# Patient Record
Sex: MALE | Race: WHITE | NOT HISPANIC OR LATINO | Employment: OTHER | URBAN - METROPOLITAN AREA
[De-identification: names, ages, dates, MRNs, and addresses within clinical notes are randomized per-mention and may not be internally consistent; named-entity substitution may affect disease eponyms.]

---

## 2017-08-18 ENCOUNTER — APPOINTMENT (OUTPATIENT)
Dept: RADIOLOGY | Facility: MEDICAL CENTER | Age: 72
End: 2017-08-18
Attending: EMERGENCY MEDICINE
Payer: MEDICARE

## 2017-08-18 ENCOUNTER — HOSPITAL ENCOUNTER (EMERGENCY)
Facility: MEDICAL CENTER | Age: 72
End: 2017-08-18
Attending: EMERGENCY MEDICINE
Payer: MEDICARE

## 2017-08-18 VITALS
OXYGEN SATURATION: 96 % | WEIGHT: 183.2 LBS | RESPIRATION RATE: 14 BRPM | HEIGHT: 67 IN | TEMPERATURE: 98.1 F | HEART RATE: 62 BPM | DIASTOLIC BLOOD PRESSURE: 91 MMHG | BODY MASS INDEX: 28.75 KG/M2 | SYSTOLIC BLOOD PRESSURE: 143 MMHG

## 2017-08-18 DIAGNOSIS — R53.1 GENERAL WEAKNESS: ICD-10-CM

## 2017-08-18 DIAGNOSIS — R10.9 FLANK PAIN: ICD-10-CM

## 2017-08-18 DIAGNOSIS — R19.7 DIARRHEA, UNSPECIFIED TYPE: ICD-10-CM

## 2017-08-18 LAB
ALBUMIN SERPL BCP-MCNC: 4.1 G/DL (ref 3.2–4.9)
ALBUMIN/GLOB SERPL: 1.9 G/DL
ALP SERPL-CCNC: 58 U/L (ref 30–99)
ALT SERPL-CCNC: 11 U/L (ref 2–50)
ANION GAP SERPL CALC-SCNC: 9 MMOL/L (ref 0–11.9)
APPEARANCE UR: CLEAR
AST SERPL-CCNC: 25 U/L (ref 12–45)
BASOPHILS # BLD AUTO: 0.4 % (ref 0–1.8)
BASOPHILS # BLD: 0.03 K/UL (ref 0–0.12)
BILIRUB SERPL-MCNC: 1 MG/DL (ref 0.1–1.5)
BUN SERPL-MCNC: 21 MG/DL (ref 8–22)
CALCIUM SERPL-MCNC: 9.3 MG/DL (ref 8.5–10.5)
CHLORIDE SERPL-SCNC: 103 MMOL/L (ref 96–112)
CO2 SERPL-SCNC: 24 MMOL/L (ref 20–33)
COLOR UR AUTO: YELLOW
CREAT SERPL-MCNC: 1.07 MG/DL (ref 0.5–1.4)
EOSINOPHIL # BLD AUTO: 0.02 K/UL (ref 0–0.51)
EOSINOPHIL NFR BLD: 0.3 % (ref 0–6.9)
ERYTHROCYTE [DISTWIDTH] IN BLOOD BY AUTOMATED COUNT: 41.4 FL (ref 35.9–50)
GFR SERPL CREATININE-BSD FRML MDRD: >60 ML/MIN/1.73 M 2
GLOBULIN SER CALC-MCNC: 2.2 G/DL (ref 1.9–3.5)
GLUCOSE SERPL-MCNC: 103 MG/DL (ref 65–99)
GLUCOSE UR QL STRIP.AUTO: NEGATIVE MG/DL
HCT VFR BLD AUTO: 43.6 % (ref 42–52)
HGB BLD-MCNC: 15.9 G/DL (ref 14–18)
IMM GRANULOCYTES # BLD AUTO: 0.02 K/UL (ref 0–0.11)
IMM GRANULOCYTES NFR BLD AUTO: 0.3 % (ref 0–0.9)
INR PPP: 1.11 (ref 0.87–1.13)
KETONES UR QL STRIP.AUTO: ABNORMAL MG/DL
LEUKOCYTE ESTERASE UR QL STRIP.AUTO: NEGATIVE
LYMPHOCYTES # BLD AUTO: 1.24 K/UL (ref 1–4.8)
LYMPHOCYTES NFR BLD: 17.6 % (ref 22–41)
MCH RBC QN AUTO: 34.4 PG (ref 27–33)
MCHC RBC AUTO-ENTMCNC: 36.5 G/DL (ref 33.7–35.3)
MCV RBC AUTO: 94.4 FL (ref 81.4–97.8)
MONOCYTES # BLD AUTO: 0.57 K/UL (ref 0–0.85)
MONOCYTES NFR BLD AUTO: 8.1 % (ref 0–13.4)
NEUTROPHILS # BLD AUTO: 5.15 K/UL (ref 1.82–7.42)
NEUTROPHILS NFR BLD: 73.3 % (ref 44–72)
NITRITE UR QL STRIP.AUTO: NEGATIVE
NRBC # BLD AUTO: 0 K/UL
NRBC BLD AUTO-RTO: 0 /100 WBC
PH UR STRIP.AUTO: 5 [PH]
PLATELET # BLD AUTO: 226 K/UL (ref 164–446)
PMV BLD AUTO: 9.6 FL (ref 9–12.9)
POTASSIUM SERPL-SCNC: 3.7 MMOL/L (ref 3.6–5.5)
PROT SERPL-MCNC: 6.3 G/DL (ref 6–8.2)
PROT UR QL STRIP: NEGATIVE MG/DL
PROTHROMBIN TIME: 14.7 SEC (ref 12–14.6)
RBC # BLD AUTO: 4.62 M/UL (ref 4.7–6.1)
RBC UR QL AUTO: ABNORMAL
SODIUM SERPL-SCNC: 136 MMOL/L (ref 135–145)
SP GR UR: 1.02
WBC # BLD AUTO: 7 K/UL (ref 4.8–10.8)

## 2017-08-18 PROCEDURE — 96375 TX/PRO/DX INJ NEW DRUG ADDON: CPT

## 2017-08-18 PROCEDURE — 304562 HCHG STAT O2 MASK/CANNULA

## 2017-08-18 PROCEDURE — 85610 PROTHROMBIN TIME: CPT

## 2017-08-18 PROCEDURE — 81002 URINALYSIS NONAUTO W/O SCOPE: CPT

## 2017-08-18 PROCEDURE — 85025 COMPLETE CBC W/AUTO DIFF WBC: CPT

## 2017-08-18 PROCEDURE — 99284 EMERGENCY DEPT VISIT MOD MDM: CPT

## 2017-08-18 PROCEDURE — 80053 COMPREHEN METABOLIC PANEL: CPT

## 2017-08-18 PROCEDURE — 74176 CT ABD & PELVIS W/O CONTRAST: CPT

## 2017-08-18 PROCEDURE — 700111 HCHG RX REV CODE 636 W/ 250 OVERRIDE (IP): Performed by: EMERGENCY MEDICINE

## 2017-08-18 PROCEDURE — 36415 COLL VENOUS BLD VENIPUNCTURE: CPT

## 2017-08-18 PROCEDURE — 96374 THER/PROPH/DIAG INJ IV PUSH: CPT

## 2017-08-18 RX ORDER — MORPHINE SULFATE 4 MG/ML
4 INJECTION, SOLUTION INTRAMUSCULAR; INTRAVENOUS ONCE
Status: COMPLETED | OUTPATIENT
Start: 2017-08-18 | End: 2017-08-18

## 2017-08-18 RX ORDER — TRAMADOL HYDROCHLORIDE 50 MG/1
50-100 TABLET ORAL EVERY 8 HOURS PRN
Qty: 30 TAB | Refills: 0 | Status: SHIPPED | OUTPATIENT
Start: 2017-08-18

## 2017-08-18 RX ORDER — ONDANSETRON 2 MG/ML
4 INJECTION INTRAMUSCULAR; INTRAVENOUS ONCE
Status: COMPLETED | OUTPATIENT
Start: 2017-08-18 | End: 2017-08-18

## 2017-08-18 RX ORDER — KETOROLAC TROMETHAMINE 30 MG/ML
30 INJECTION, SOLUTION INTRAMUSCULAR; INTRAVENOUS ONCE
Status: COMPLETED | OUTPATIENT
Start: 2017-08-18 | End: 2017-08-18

## 2017-08-18 RX ADMIN — KETOROLAC TROMETHAMINE 30 MG: 30 INJECTION, SOLUTION INTRAMUSCULAR at 14:50

## 2017-08-18 RX ADMIN — ONDANSETRON 4 MG: 2 INJECTION INTRAMUSCULAR; INTRAVENOUS at 14:50

## 2017-08-18 RX ADMIN — MORPHINE SULFATE 4 MG: 4 INJECTION INTRAVENOUS at 14:50

## 2017-08-18 ASSESSMENT — PAIN SCALES - GENERAL
PAINLEVEL_OUTOF10: 7
PAINLEVEL_OUTOF10: 5

## 2017-08-18 NOTE — ED NOTES
"Ronak Sampson  72 y.o.  Chief Complaint   Patient presents with   • Flank Pain     right sided flank pain since this am, \"feels like a kidney stone\", \"been going down hill for a week\"   • Diarrhea     x 10-14 days, denies vomiting     Urine specimen supplies and instructions provided. Pt assisted to senior luishéctor with family, instructed on red phone use for assistance.   "

## 2017-08-18 NOTE — DISCHARGE INSTRUCTIONS
Flank Pain  Flank pain is pain in your side. The flank is the area of your side between your upper belly (abdomen) and your back. Pain in this area can be caused by many different things.  HOME CARE  Home care and treatment will depend on the cause of your pain.  · Rest as told by your doctor.  · Drink enough fluids to keep your pee (urine) clear or pale yellow.    · Only take medicine as told by your doctor.  · Tell your doctor about any changes in your pain.  · Follow up with your doctor.  GET HELP RIGHT AWAY IF:   · Your pain does not get better with medicine.    · You have new symptoms or your symptoms get worse.  · Your pain gets worse.    · You have belly (abdominal) pain.    · You are short of breath.    · You always feel sick to your stomach (nauseous).    · You keep throwing up (vomiting).    · You have puffiness (swelling) in your belly.    · You feel light-headed or you pass out (faint).    · You have blood in your pee.  · You have a fever or lasting symptoms for more than 2-3 days.  · You have a fever and your symptoms suddenly get worse.  MAKE SURE YOU:   · Understand these instructions.  · Will watch your condition.  · Will get help right away if you are not doing well or get worse.     This information is not intended to replace advice given to you by your health care provider. Make sure you discuss any questions you have with your health care provider.     Document Released: 09/26/2009 Document Revised: 01/08/2016 Document Reviewed: 08/01/2013  [employer] Interactive Patient Education ©2016 Elsevier Inc.    Diarrhea  Diarrhea is watery poop (stool). It can make you feel weak, tired, thirsty, or give you a dry mouth (signs of dehydration). Watery poop is a sign of another problem, most often an infection. It often lasts 2-3 days. It can last longer if it is a sign of something serious. Take care of yourself as told by your doctor.  HOME CARE   · Drink 1 cup (8 ounces) of fluid each time you have watery  poop.  · Do not drink the following fluids:  ¨ Those that contain simple sugars (fructose, glucose, galactose, lactose, sucrose, maltose).  ¨ Sports drinks.  ¨ Fruit juices.  ¨ Whole milk products.  ¨ Sodas.  ¨ Drinks with caffeine (coffee, tea, soda) or alcohol.  · Oral rehydration solution may be used if the doctor says it is okay. You may make your own solution. Follow this recipe:  ¨  - teaspoon table salt.  ¨ ¾ teaspoon baking soda.  ¨  teaspoon salt substitute containing potassium chloride.  ¨ 1 tablespoons sugar.  ¨ 1 liter (34 ounces) of water.  · Avoid the following foods:  ¨ High fiber foods, such as raw fruits and vegetables.  ¨ Nuts, seeds, and whole grain breads and cereals.  ¨  Those that are sweetened with sugar alcohols (xylitol, sorbitol, mannitol).  · Try eating the following foods:  ¨ Starchy foods, such as rice, toast, pasta, low-sugar cereal, oatmeal, baked potatoes, crackers, and bagels.  ¨ Bananas.  ¨ Applesauce.  · Eat probiotic-rich foods, such as yogurt and milk products that are fermented.  · Wash your hands well after each time you have watery poop.  · Only take medicine as told by your doctor.  · Take a warm bath to help lessen burning or pain from having watery poop.  GET HELP RIGHT AWAY IF:   · You cannot drink fluids without throwing up (vomiting).  · You keep throwing up.  · You have blood in your poop, or your poop looks black and tarry.  · You do not pee (urinate) in 6-8 hours, or there is only a small amount of very dark pee.  · You have belly (abdominal) pain that gets worse or stays in the same spot (localizes).  · You are weak, dizzy, confused, or light-headed.  · You have a very bad headache.  · Your watery poop gets worse or does not get better.  · You have a fever or lasting symptoms for more than 2-3 days.  · You have a fever and your symptoms suddenly get worse.  MAKE SURE YOU:   · Understand these instructions.  · Will watch your condition.  · Will get help right away if  you are not doing well or get worse.     This information is not intended to replace advice given to you by your health care provider. Make sure you discuss any questions you have with your health care provider.     Document Released: 06/05/2009 Document Revised: 01/08/2016 Document Reviewed: 08/25/2013  Elsevier Interactive Patient Education ©2016 Elsevier Inc.

## 2017-08-18 NOTE — ED NOTES
The Medication Reconciliation process has been completed by interviewing the patient    Allergies have been reviewed  Antibiotic use in 30 days - none

## 2017-08-18 NOTE — ED NOTES
Patient unable to provide stool sample.  ERP aware.  PIV removed.  Patient and significant other given discharge instructions, follow up information, and prescription x 1, instructed not to drive home, verbalized understanding, sent home with stool specimen collection supplies and lab slip.  Ambulatory out of ED w/steady gait.

## 2017-08-18 NOTE — ED AVS SNAPSHOT
Home Care Instructions                                                                                                                Ronak Sampson   MRN: [id number]    Department:  [facility], Emergency Dept   Date of Visit:  8/18/2017            [facility], Emergency Dept    [address]    [place] [address]    Phone:  [phone]      You were seen by     Garrick Foster D.O.      Your Diagnosis Was     Flank pain     R10.9       These are the medications you received during your hospitalization from 08/18/2017 1139 to 08/18/2017 1621     Date/Time Order Dose Route Action    08/18/2017 1450 ketorolac (TORADOL) injection 30 mg 30 mg Intravenous Given    08/18/2017 1450 morphine (pf) 4 mg/ml injection 4 mg 4 mg Intravenous Given    08/18/2017 1450 ondansetron (ZOFRAN) syringe/vial injection 4 mg 4 mg Intravenous Given      Follow-up Information     1. Follow up with [facility].    Contact information    [address]  [facility] [address] [phone]        2. Follow up with Mauri Ram M.D..    Specialty:  Urology    Why:  Urologist    Contact information    [address]  [address]  [facility] [address]  [phone]        Medication Information     Review all of your home medications and newly ordered medications with your primary doctor and/or pharmacist as soon as possible. Follow medication instructions as directed by your doctor and/or pharmacist.     Please keep your complete medication list with you and share with your physician. Update the information when medications are discontinued, doses are changed, or new medications (including over-the-counter products) are added; and carry medication information at all times in the event of emergency situations.               Medication List      START taking these medications        Instructions    Morning Afternoon Evening Bedtime    tramadol 50 MG Tabs   Commonly known as:  ULTRAM        Take  1-2 Tabs by mouth every 8 hours as needed (pain).   Dose:   mg                          ASK your doctor about these medications        Instructions    Morning Afternoon Evening Bedtime    acetaminophen 500 MG Tabs   Commonly known as:  TYLENOL        Take 500-1,000 mg by mouth every 6 hours as needed.   Dose:  500-1000 mg                             Where to Get Your Medications      You can get these medications from any pharmacy     Bring a paper prescription for each of these medications    - tramadol 50 MG Tabs            Procedures and tests performed during your visit     CBC WITH DIFFERENTIAL    COMP METABOLIC PANEL    CT-RENAL COLIC EVALUATION(A/P W/O)    ESTIMATED GFR    POC UA    PROTHROMBIN TIME    SALINE LOCK        Discharge Instructions       Flank Pain  Flank pain is pain in your side. The flank is the area of your side between your upper belly (abdomen) and your back. Pain in this area can be caused by many different things.  HOME CARE  Home care and treatment will depend on the cause of your pain.  · Rest as told by your doctor.  · Drink enough fluids to keep your pee (urine) clear or pale yellow.    · Only take medicine as told by your doctor.  · Tell your doctor about any changes in your pain.  · Follow up with your doctor.  GET HELP RIGHT AWAY IF:   · Your pain does not get better with medicine.    · You have new symptoms or your symptoms get worse.  · Your pain gets worse.    · You have belly (abdominal) pain.    · You are short of breath.    · You always feel sick to your stomach (nauseous).    · You keep throwing up (vomiting).    · You have puffiness (swelling) in your belly.    · You feel light-headed or you pass out (faint).    · You have blood in your pee.  · You have a fever or lasting symptoms for more than 2-3 days.  · You have a fever and your symptoms suddenly get worse.  MAKE SURE YOU:   · Understand these instructions.  · Will watch your condition.  · Will get help right away  if you are not doing well or get worse.     This information is not intended to replace advice given to you by your health care provider. Make sure you discuss any questions you have with your health care provider.     Document Released: 09/26/2009 Document Revised: 01/08/2016 Document Reviewed: 08/01/2013  [employer] Interactive Patient Education ©2016 Elsevier Inc.    Diarrhea  Diarrhea is watery poop (stool). It can make you feel weak, tired, thirsty, or give you a dry mouth (signs of dehydration). Watery poop is a sign of another problem, most often an infection. It often lasts 2-3 days. It can last longer if it is a sign of something serious. Take care of yourself as told by your doctor.  HOME CARE   · Drink 1 cup (8 ounces) of fluid each time you have watery poop.  · Do not drink the following fluids:  ¨ Those that contain simple sugars (fructose, glucose, galactose, lactose, sucrose, maltose).  ¨ Sports drinks.  ¨ Fruit juices.  ¨ Whole milk products.  ¨ Sodas.  ¨ Drinks with caffeine (coffee, tea, soda) or alcohol.  · Oral rehydration solution may be used if the doctor says it is okay. You may make your own solution. Follow this recipe:  ¨  - teaspoon table salt.  ¨ ¾ teaspoon baking soda.  ¨  teaspoon salt substitute containing potassium chloride.  ¨ 1 tablespoons sugar.  ¨ 1 liter (34 ounces) of water.  · Avoid the following foods:  ¨ High fiber foods, such as raw fruits and vegetables.  ¨ Nuts, seeds, and whole grain breads and cereals.  ¨  Those that are sweetened with sugar alcohols (xylitol, sorbitol, mannitol).  · Try eating the following foods:  ¨ Starchy foods, such as rice, toast, pasta, low-sugar cereal, oatmeal, baked potatoes, crackers, and bagels.  ¨ Bananas.  ¨ Applesauce.  · Eat probiotic-rich foods, such as yogurt and milk products that are fermented.  · Wash your hands well after each time you have watery poop.  · Only take medicine as told by your doctor.  · Take a warm bath to help lessen  burning or pain from having watery poop.  GET HELP RIGHT AWAY IF:   · You cannot drink fluids without throwing up (vomiting).  · You keep throwing up.  · You have blood in your poop, or your poop looks black and tarry.  · You do not pee (urinate) in 6-8 hours, or there is only a small amount of very dark pee.  · You have belly (abdominal) pain that gets worse or stays in the same spot (localizes).  · You are weak, dizzy, confused, or light-headed.  · You have a very bad headache.  · Your watery poop gets worse or does not get better.  · You have a fever or lasting symptoms for more than 2-3 days.  · You have a fever and your symptoms suddenly get worse.  MAKE SURE YOU:   · Understand these instructions.  · Will watch your condition.  · Will get help right away if you are not doing well or get worse.     This information is not intended to replace advice given to you by your health care provider. Make sure you discuss any questions you have with your health care provider.     Document Released: 06/05/2009 Document Revised: 01/08/2016 Document Reviewed: 08/25/2013  [employer] Interactive Patient Education ©2016 [employer]            Patient Information     Patient Information    Following emergency treatment: all patient requiring follow-up care must return either to a private physician or a clinic if your condition worsens before you are able to obtain further medical attention, please return to the emergency room.     Billing Information    At [facility], we work to make the billing process streamlined for our patients.  Our Representatives are here to answer any questions you may have regarding your hospital bill.  If you have insurance coverage and have supplied your insurance information to us, we will submit a claim to your insurer on your behalf.  Should you have any questions regarding your bill, we can be reached online or by phone as follows:  Online: You are able pay your bills online or live chat with  our representatives about any billing questions you may have. We are here to help Monday - Friday from 8:00am to 7:30pm and 9:00am - 12:00pm on Saturdays.  Please visit [email or web address]  for more information.   Phone:  [phone] or [phone]    Please note that your emergency physician, surgeon, pathologist, radiologist, anesthesiologist, and other specialists are not employed by [facility] and will therefore bill separately for their services.  Please contact them directly for any questions concerning their bills at the numbers below:     Emergency Physician Services:  [phone]  [facility]:  [phone]  [facility]:  [phone]  [facility]:  [phone]    1. Your final bill may vary from the amount quoted upon discharge if all procedures are not complete at that time, or if your doctor has additional procedures of which we are not aware. You will receive an additional bill if you return to the Emergency Department at [facility] for suture removal regardless of the facility of which the sutures were placed.     2. Please arrange for settlement of this account at the emergency registration.    3. All self-pay accounts are due in full at the time of treatment.  If you are unable to meet this obligation then payment is expected within 4-5 days.     4. If you have had radiology studies (CT, X-ray, Ultrasound, MRI), you have received a preliminary result during your emergency department visit. Please contact the radiology department [phone] to receive a copy of your final result. Please discuss the Final result with your primary physician or with the follow up physician provided.     Crisis Hotline:  [place] Crisis Hotline:  0-343-IXXKCSN or 1-520.233.4237  [place] Crisis Hotline:    1-667.392.1135 or 384-628-5341         ED Discharge Follow Up Questions    1. In order to provide you with very good care, we  would like to follow up with a phone call in the next few days.  May we have your permission to contact you?     YES /  NO    2. What is the best phone number to call you? (       )_____-__________    3. What is the best time to call you?      Morning  /  Afternoon  /  Evening                   Patient Signature:  ____________________________________________________________    Date:  ____________________________________________________________

## 2017-08-18 NOTE — ED AVS SNAPSHOT
ZeroFOX Access Code: [email or web address]  Expires: 9/17/2017  4:21 PM    ZeroFOX  A secure, online tool to manage your health information     [employer]’s ZeroFOX® is a secure, online tool that connects you to your personalized health information from the privacy of your home -- day or night - making it very easy for you to manage your healthcare. Once the activation process is completed, you can even access your medical information using the ZeroFOX sixto, which is available for free in the [employer] Sixto store or Google Play store.     ZeroFOX provides the following levels of access (as shown below):   My Chart Features   [facility] Primary Care Doctor [facility]  Specialists [facility]  Urgent  Care Non-[facility]  Primary Care  Doctor   Email your healthcare team securely and privately 24/7 X X X X   Manage appointments: schedule your next appointment; view details of past/upcoming appointments X      Request prescription refills. X      View recent personal medical records, including lab and immunizations X X X X   View health record, including health history, allergies, medications X X X X   Read reports about your outpatient visits, procedures, consult and ER notes X X X X   See your discharge summary, which is a recap of your hospital and/or ER visit that includes your diagnosis, lab results, and care plan. X X       How to register for ZeroFOX:  1. Go to  [email or web address].  2. Click on the Sign Up Now box, which takes you to the New Member Sign Up page. You will need to provide the following information:  a. Enter your ZeroFOX Access Code exactly as it appears at the top of this page. (You will not need to use this code after you’ve completed the sign-up process. If you do not sign up before the expiration date, you must request a new code.)   b. Enter your date of birth.   c. Enter your home email address.   d. Click Submit, and follow the next screen’s instructions.  3. Create a ZeroFOX ID. This will be your ZeroFOX  login ID and cannot be changed, so think of one that is secure and easy to remember.  4. Create a [employer] password. You can change your password at any time.  5. Enter your Password Reset Question and Answer. This can be used at a later time if you forget your password.   6. Enter your e-mail address. This allows you to receive e-mail notifications when new information is available in [employer].  7. Click Sign Up. You can now view your health information.    For assistance activating your [employer] account, call [phone]

## 2017-08-18 NOTE — ED AVS SNAPSHOT
8/18/2017    Ronak Sampson  [id number] [facility] [address]    Dear Ronak:    Psychiatric hospital wants to ensure your discharge home is safe and you or your loved ones have had all of your questions answered regarding your care after you leave the hospital.    Below is a list of resources and contact information should you have any questions regarding your hospital stay, follow-up instructions, or active medical symptoms.    Questions or Concerns Regarding… Contact   Medical Questions Related to Your Discharge  (7 days a week, 8am-5pm) Contact a Nurse Care Coordinator   [phone]   Medical Questions Not Related to Your Discharge  (24 hours a day / 7 days a week)  Contact the Nurse Health Line   [phone]    Medications or Discharge Instructions Refer to your discharge packet   or contact your [facility] Primary Care Provider   [phone]   Follow-up Appointment(s) Schedule your appointment via August   or contact Scheduling [phone]   Billing Review your statement via August  or contact Billing [phone]   Medical Records Review your records via August   or contact Medical Records [phone]     You may receive a telephone call within two days of discharge. This call is to make certain you understand your discharge instructions and have the opportunity to have any questions answered. You can also easily access your medical information, test results and upcoming appointments via the [employer] free online health management tool. You can learn more and sign up at [employer]/August. For assistance setting up your August account, please call [phone].    Once again, we want to ensure your discharge home is safe and that you have a clear understanding of any next steps in your care. If you have any questions or concerns, please do not hesitate to contact us, we are here for you. Thank you for choosing [facility] for your healthcare needs.    Sincerely,    Your [facility] Healthcare Team

## 2017-08-19 NOTE — ED PROVIDER NOTES
"ED Provider Note    CHIEF COMPLAINT  Chief Complaint   Patient presents with   • Flank Pain     right sided flank pain since this am, \"feels like a kidney stone\", \"been going down hill for a week\"   • Diarrhea     x 10-14 days, denies vomiting       HPI  Ronak Sampson is a 72 y.o. male who presents to worsen today with complaint of right-sided flank and back pain. he states this been present for one week. He also states he's had diarrhea on and off for 2-4 weeks. he has very poor historian and very anxious at times. He has had no blood in his stool stool has normal color. He states that he has been \"going downhill\" and when he states his he refers to weakness that he is not his usual self and is generalized. No chest pain no shortness of breath fever or chills. Apparently he has had a history kidney stones on the left side but states that his pain does feel like kidney stone however when asked he does have a history of chronic back pain which is in the same area of his pain today. Pain is reproduced with movement or palpation denies any numbness or tingling no incontinence of bowel or bladder, no saddle paresthesia.    REVIEW OF SYSTEMS  See HPI for further details. All other systems are negative.     PAST MEDICAL HISTORY  No past medical history on file.    FAMILY HISTORY  [unfilled]    SOCIAL HISTORY  Social History     Social History   • Marital Status:      Spouse Name: N/A   • Number of Children: N/A   • Years of Education: N/A     Social History Main Topics   • Smoking status: Not on file   • Smokeless tobacco: Not on file   • Alcohol Use: Not on file   • Drug Use: Not on file   • Sexual Activity: Not on file     Other Topics Concern   • Not on file     Social History Narrative       SURGICAL HISTORY  Past Surgical History   Procedure Laterality Date   • Gastroscopy-endo N/A 2/25/2016     Procedure: GASTROSCOPY-ENDO;  Surgeon: Piyush Cole M.D.;  Location: ENDOSCOPY [facility];  Service:    • " "Gastroscopy with clipping  2/26/2016     Procedure: GASTROSCOPY WITH CLIPPING;  Surgeon: Lee Powers M.D.;  Location: ENDOSCOPY [facility];  Service:    • Colonoscopy - endo  2/28/2016     Procedure: COLONOSCOPY - ENDO;  Surgeon: Lee Powers M.D.;  Location: ENDOSCOPY [facility];  Service:        CURRENT MEDICATIONS  Home Medications     Reviewed by Efren Downs (Pharmacy Tech) on 08/18/17 at 1542  Med List Status: Complete    Medication Last Dose Status    acetaminophen (TYLENOL) 500 MG Tab 8/18/2017 Active                ALLERGIES  No Known Allergies    PHYSICAL EXAM  VITAL SIGNS: /91 mmHg  Pulse 62  Temp(Src) 36.7 °C (98.1 °F) (Temporal)  Resp 14  Ht 1.702 m (5' 7.01\")  Wt 83.1 kg (183 lb 3.2 oz)  BMI 28.69 kg/m2  SpO2 96%      Constitutional: Well developed, Well nourished, No acute distress, Non-toxic appearance.   HENT: Normocephalic, Atraumatic, Bilateral external ears normal, Oropharynx moist, No oral exudates, Nose normal.   Eyes: PERRLA, EOMI, Conjunctiva normal, No discharge.   Neck: Normal range of motion, No tenderness, Supple, No stridor.   Lymphatic: No lymphadenopathy noted.   Cardiovascular: Normal heart rate, Normal rhythm, No murmurs, No rubs, No gallops.   Thorax & Lungs: Normal breath sounds, No respiratory distress, No wheezing, No chest tenderness.   Abdomen: Bowel sounds normal, Soft, No tenderness, No masses, No pulsatile masses.   Skin: Warm, Dry, No erythema, No rash.   Back: Tenderness over the lower thoracic upper lumbar area increased on the right with palpation or movement. No bony gross deformities noted   Extremities: Intact distal pulses, No edema, No tenderness, No cyanosis, No clubbing.   Musculoskeletal: Good range of motion in all major joints. No tenderness to palpation or major deformities noted.   Neurologic: Alert & oriented x 3, Normal motor function, Normal sensory function, No focal deficits noted.   Psychiatric: Affect " normal, Judgment normal, Mood anxious       RADIOLOGY/PROCEDURES  CT-RENAL COLIC EVALUATION(A/P W/O)   Final Result      1.  There are left lower pole calyceal stones without obstruction.   2.  There are no right renal calcifications.   3.  There is no hydronephrosis or hydroureter there is no urolithiasis.   4.  There are bilateral renal cysts.   5.  The appendix is normal.   6.  There is minimal sigmoid diverticulosis without diverticulitis.            COURSE & MEDICAL DECISION MAKING  Pertinent Labs & Imaging studies reviewed. (See chart for details)  Patient has had kidney stones on the left side but these are nonobstructing, urine did show evidence of blood discussed need for follow-up with urologist repeat urine test 1-2 weeks. There is no evidence of kidney stone on the right. I suggested to patient we need to stool specimens he does have well water at home he was unable to provide stool specimen for us so he was sent home with outpatient order for O&P/C. difficile/stool culture. He will follow up with his GI physician contacted this week for appointment at [facility]. I feel that this is causing his generalized weakness there is no evidence of kidney stone on the right side and is left sided kidney stones or nonobstructive. He does have history of chronic pain and this may be his pain is experiencing in his back which certainly is worse lately and he was placed on Ultram for home for his pain. If he develops fever, vomiting, worsening or persistent symptoms over next 12-24 hours or blood in his stool to return promptly to the emergency room patient has wife verbalized understanding instructions need for follow-up as described above.    FINAL IMPRESSION  1. Acute flank pain/thoracolumbar back pain  2. Diarrhea   3.         Electronically signed by: Garrick Foster, 8/18/2017 10:24 PM

## 2017-08-29 ENCOUNTER — HOSPITAL ENCOUNTER (OUTPATIENT)
Facility: MEDICAL CENTER | Age: 72
End: 2017-08-29
Attending: EMERGENCY MEDICINE
Payer: MEDICARE

## 2017-08-29 PROCEDURE — 87493 C DIFF AMPLIFIED PROBE: CPT

## 2017-08-30 ENCOUNTER — HOSPITAL ENCOUNTER (OUTPATIENT)
Facility: MEDICAL CENTER | Age: 72
End: 2017-08-30
Attending: EMERGENCY MEDICINE
Payer: MEDICARE

## 2017-08-30 LAB
C DIFF DNA SPEC QL NAA+PROBE: NEGATIVE
C DIFF TOX GENS STL QL NAA+PROBE: NEGATIVE
G LAMBLIA+C PARVUM AG STL QL RAPID: NORMAL
SIGNIFICANT IND 70042: NORMAL
SOURCE SOURCE: NORMAL

## 2017-08-30 PROCEDURE — 87329 GIARDIA AG IA: CPT

## 2017-08-30 PROCEDURE — 87328 CRYPTOSPORIDIUM AG IA: CPT

## 2017-08-30 PROCEDURE — 87046 STOOL CULTR AEROBIC BACT EA: CPT

## 2017-08-30 PROCEDURE — 87899 AGENT NOS ASSAY W/OPTIC: CPT

## 2017-08-30 PROCEDURE — 87045 FECES CULTURE AEROBIC BACT: CPT

## 2017-08-31 LAB
E COLI SXT1+2 STL IA: NORMAL
SIGNIFICANT IND 70042: NORMAL
SOURCE SOURCE: NORMAL

## 2017-09-03 LAB
BACTERIA STL CULT: NORMAL
E COLI SXT1+2 STL IA: NORMAL
SIGNIFICANT IND 70042: NORMAL
SOURCE SOURCE: NORMAL

## 2018-11-14 ENCOUNTER — APPOINTMENT (RX ONLY)
Dept: URBAN - METROPOLITAN AREA CLINIC 20 | Facility: CLINIC | Age: 73
Setting detail: DERMATOLOGY
End: 2018-11-14

## 2018-11-30 ENCOUNTER — APPOINTMENT (RX ONLY)
Dept: URBAN - METROPOLITAN AREA CLINIC 20 | Facility: CLINIC | Age: 73
Setting detail: DERMATOLOGY
End: 2018-11-30

## 2018-11-30 DIAGNOSIS — Z71.89 OTHER SPECIFIED COUNSELING: ICD-10-CM

## 2018-11-30 DIAGNOSIS — D18.0 HEMANGIOMA: ICD-10-CM

## 2018-11-30 DIAGNOSIS — D22 MELANOCYTIC NEVI: ICD-10-CM

## 2018-11-30 DIAGNOSIS — L81.4 OTHER MELANIN HYPERPIGMENTATION: ICD-10-CM

## 2018-11-30 DIAGNOSIS — L82.1 OTHER SEBORRHEIC KERATOSIS: ICD-10-CM

## 2018-11-30 DIAGNOSIS — L82.0 INFLAMED SEBORRHEIC KERATOSIS: ICD-10-CM

## 2018-11-30 DIAGNOSIS — L57.0 ACTINIC KERATOSIS: ICD-10-CM

## 2018-11-30 PROBLEM — D22.61 MELANOCYTIC NEVI OF RIGHT UPPER LIMB, INCLUDING SHOULDER: Status: ACTIVE | Noted: 2018-11-30

## 2018-11-30 PROBLEM — D22.5 MELANOCYTIC NEVI OF TRUNK: Status: ACTIVE | Noted: 2018-11-30

## 2018-11-30 PROBLEM — D18.01 HEMANGIOMA OF SKIN AND SUBCUTANEOUS TISSUE: Status: ACTIVE | Noted: 2018-11-30

## 2018-11-30 PROBLEM — D48.5 NEOPLASM OF UNCERTAIN BEHAVIOR OF SKIN: Status: ACTIVE | Noted: 2018-11-30

## 2018-11-30 PROBLEM — D22.62 MELANOCYTIC NEVI OF LEFT UPPER LIMB, INCLUDING SHOULDER: Status: ACTIVE | Noted: 2018-11-30

## 2018-11-30 PROBLEM — I10 ESSENTIAL (PRIMARY) HYPERTENSION: Status: ACTIVE | Noted: 2018-11-30

## 2018-11-30 PROCEDURE — ? COUNSELING

## 2018-11-30 PROCEDURE — 17110 DESTRUCTION B9 LES UP TO 14: CPT | Mod: 59

## 2018-11-30 PROCEDURE — ? BIOPSY BY SHAVE METHOD

## 2018-11-30 PROCEDURE — ? LIQUID NITROGEN

## 2018-11-30 PROCEDURE — 17004 DESTROY PREMAL LESIONS 15/>: CPT

## 2018-11-30 PROCEDURE — 99203 OFFICE O/P NEW LOW 30 MIN: CPT | Mod: 25

## 2018-11-30 PROCEDURE — 11100: CPT | Mod: 59

## 2018-11-30 PROCEDURE — ? SUNSCREEN RECOMMENDATIONS

## 2018-11-30 ASSESSMENT — LOCATION DETAILED DESCRIPTION DERM
LOCATION DETAILED: LEFT CENTRAL MALAR CHEEK
LOCATION DETAILED: LEFT CENTRAL FRONTAL SCALP
LOCATION DETAILED: RIGHT DISTAL LATERAL POSTERIOR UPPER ARM
LOCATION DETAILED: RIGHT MEDIAL UPPER BACK
LOCATION DETAILED: RIGHT POSTERIOR SHOULDER
LOCATION DETAILED: RIGHT SUPERIOR PARIETAL SCALP
LOCATION DETAILED: LEFT RADIAL DORSAL HAND
LOCATION DETAILED: RIGHT CENTRAL MALAR CHEEK
LOCATION DETAILED: SUPERIOR THORACIC SPINE
LOCATION DETAILED: RIGHT ANTERIOR PROXIMAL UPPER ARM
LOCATION DETAILED: RIGHT VENTRAL DISTAL FOREARM
LOCATION DETAILED: RIGHT MID-UPPER BACK
LOCATION DETAILED: LEFT SUPERIOR FOREHEAD
LOCATION DETAILED: MID POSTERIOR NECK
LOCATION DETAILED: LEFT SUPERIOR MEDIAL MALAR CHEEK
LOCATION DETAILED: LEFT INFERIOR MEDIAL MALAR CHEEK
LOCATION DETAILED: RIGHT INFERIOR UPPER BACK
LOCATION DETAILED: INFERIOR MID FOREHEAD
LOCATION DETAILED: LEFT SUPERIOR UPPER BACK
LOCATION DETAILED: EPIGASTRIC SKIN
LOCATION DETAILED: LEFT INFERIOR LATERAL MALAR CHEEK
LOCATION DETAILED: LEFT ANTERIOR PROXIMAL UPPER ARM
LOCATION DETAILED: LEFT MEDIAL INFERIOR CHEST
LOCATION DETAILED: RIGHT LATERAL EYEBROW
LOCATION DETAILED: LEFT CENTRAL LATERAL NECK
LOCATION DETAILED: LEFT INFERIOR CENTRAL MALAR CHEEK
LOCATION DETAILED: RIGHT CLAVICULAR NECK
LOCATION DETAILED: RIGHT SUPERIOR LATERAL UPPER BACK
LOCATION DETAILED: RIGHT DISTAL DORSAL FOREARM
LOCATION DETAILED: LEFT VENTRAL PROXIMAL FOREARM
LOCATION DETAILED: LEFT PROXIMAL POSTERIOR UPPER ARM
LOCATION DETAILED: INFERIOR THORACIC SPINE
LOCATION DETAILED: LEFT CLAVICULAR NECK
LOCATION DETAILED: RIGHT INFERIOR MEDIAL FOREHEAD
LOCATION DETAILED: LEFT SUPERIOR LATERAL BUCCAL CHEEK
LOCATION DETAILED: RIGHT LATERAL FOREHEAD
LOCATION DETAILED: RIGHT PROXIMAL DORSAL FOREARM
LOCATION DETAILED: RIGHT RADIAL DORSAL HAND
LOCATION DETAILED: RIGHT SUPERIOR OCCIPITAL SCALP
LOCATION DETAILED: LEFT SUPERIOR PARIETAL SCALP
LOCATION DETAILED: LEFT PROXIMAL DORSAL FOREARM
LOCATION DETAILED: STERNUM
LOCATION DETAILED: LEFT SUPERIOR LATERAL MALAR CHEEK
LOCATION DETAILED: LEFT SUPERIOR OCCIPITAL SCALP
LOCATION DETAILED: LEFT CENTRAL BUCCAL CHEEK

## 2018-11-30 ASSESSMENT — LOCATION SIMPLE DESCRIPTION DERM
LOCATION SIMPLE: LEFT OCCIPITAL SCALP
LOCATION SIMPLE: POSTERIOR NECK
LOCATION SIMPLE: LEFT CHEEK
LOCATION SIMPLE: LEFT SCALP
LOCATION SIMPLE: RIGHT FOREHEAD
LOCATION SIMPLE: RIGHT FOREARM
LOCATION SIMPLE: ABDOMEN
LOCATION SIMPLE: RIGHT ANTERIOR NECK
LOCATION SIMPLE: NECK
LOCATION SIMPLE: LEFT UPPER ARM
LOCATION SIMPLE: RIGHT HAND
LOCATION SIMPLE: CHEST
LOCATION SIMPLE: SCALP
LOCATION SIMPLE: LEFT HAND
LOCATION SIMPLE: LEFT ANTERIOR NECK
LOCATION SIMPLE: UPPER BACK
LOCATION SIMPLE: RIGHT EYEBROW
LOCATION SIMPLE: LEFT FOREHEAD
LOCATION SIMPLE: RIGHT CHEEK
LOCATION SIMPLE: RIGHT UPPER BACK
LOCATION SIMPLE: RIGHT UPPER ARM
LOCATION SIMPLE: RIGHT SHOULDER
LOCATION SIMPLE: LEFT FOREARM
LOCATION SIMPLE: LEFT UPPER BACK
LOCATION SIMPLE: INFERIOR FOREHEAD
LOCATION SIMPLE: RIGHT OCCIPITAL SCALP

## 2018-11-30 ASSESSMENT — LOCATION ZONE DERM
LOCATION ZONE: NECK
LOCATION ZONE: ARM
LOCATION ZONE: FACE
LOCATION ZONE: TRUNK
LOCATION ZONE: HAND
LOCATION ZONE: SCALP

## 2018-11-30 NOTE — PROCEDURE: BIOPSY BY SHAVE METHOD
Type Of Destruction Used: Curettage
Anesthesia Volume In Cc: 1
Billing Type: Third-Party Bill
Dressing: Band-Aid
Was A Bandage Applied: Yes
Post-Care Instructions: I reviewed with the patient in detail post-care instructions. Patient is to keep the biopsy site clean once daily and then apply petroleum and bandaid  until healed.
Hemostasis: Drysol and Electrocautery
Detail Level: Detailed
Biopsy Method: Personna blade
Notification Instructions: Patient will be notified of biopsy results. However, patient instructed to call the office if not contacted within 2 weeks.
Lab: [id number]
Bill For Surgical Tray: no
Anesthesia Type: 1% lidocaine with 1:100,000 epinephrine and a 1:12 solution of 8.4% sodium bicarbonate
Consent: Written consent was obtained and risks were reviewed including but not limited to scarring, infection, bleeding, scabbing, incomplete removal, nerve damage and allergy to anesthesia.
Lab Facility: 
Size Of Lesion In Cm: 1.2
Additional Anesthesia Volume In Cc (Will Not Render If 0): 0
Depth Of Biopsy: dermis
Wound Care: Bacitracin
Biopsy Type: H and E

## 2018-11-30 NOTE — PROCEDURE: LIQUID NITROGEN
Consent: The patient's consent was obtained including but not limited to risks of crusting, scabbing, blistering, scarring, darker or lighter pigmentary change, recurrence, incomplete removal and infection.
Include Z78.9 (Other Specified Conditions Influencing Health Status) As An Associated Diagnosis?: No
Medical Necessity Clause: This procedure was medically necessary because the lesions that were treated were:
Medical Necessity Information: It is in your best interest to select a reason for this procedure from the list below. All of these items fulfill various CMS LCD requirements except the new and changing color options.
Detail Level: Detailed
Post-Care Instructions: I reviewed with the patient in detail post-care instructions. Patient is to wear sunprotection, and avoid picking at any of the treated lesions. Pt may apply Vaseline to crusted or scabbing areas.
Duration Of Freeze Thaw-Cycle (Seconds): 10
Number Of Freeze-Thaw Cycles: 2 freeze-thaw cycles
Render Post-Care Instructions In Note?: yes
Consent: The patient's consent was obtained including but not limited to risks of crusting, scabbing, blistering, scarring, darker or lighter pigmentary change, recurrence, incomplete removal and infection. RTC in 2 months if lesion(s) persistent.

## 2019-03-25 ENCOUNTER — APPOINTMENT (RX ONLY)
Dept: URBAN - METROPOLITAN AREA CLINIC 20 | Facility: CLINIC | Age: 74
Setting detail: DERMATOLOGY
End: 2019-03-25

## 2019-03-25 DIAGNOSIS — D18.0 HEMANGIOMA: ICD-10-CM

## 2019-03-25 DIAGNOSIS — L82.0 INFLAMED SEBORRHEIC KERATOSIS: ICD-10-CM

## 2019-03-25 DIAGNOSIS — L81.4 OTHER MELANIN HYPERPIGMENTATION: ICD-10-CM

## 2019-03-25 DIAGNOSIS — Z71.89 OTHER SPECIFIED COUNSELING: ICD-10-CM

## 2019-03-25 DIAGNOSIS — L82.1 OTHER SEBORRHEIC KERATOSIS: ICD-10-CM

## 2019-03-25 DIAGNOSIS — D22 MELANOCYTIC NEVI: ICD-10-CM

## 2019-03-25 DIAGNOSIS — L57.0 ACTINIC KERATOSIS: ICD-10-CM

## 2019-03-25 PROBLEM — D22.62 MELANOCYTIC NEVI OF LEFT UPPER LIMB, INCLUDING SHOULDER: Status: ACTIVE | Noted: 2019-03-25

## 2019-03-25 PROBLEM — D22.61 MELANOCYTIC NEVI OF RIGHT UPPER LIMB, INCLUDING SHOULDER: Status: ACTIVE | Noted: 2019-03-25

## 2019-03-25 PROBLEM — D18.01 HEMANGIOMA OF SKIN AND SUBCUTANEOUS TISSUE: Status: ACTIVE | Noted: 2019-03-25

## 2019-03-25 PROBLEM — D22.5 MELANOCYTIC NEVI OF TRUNK: Status: ACTIVE | Noted: 2019-03-25

## 2019-03-25 PROCEDURE — ? COUNSELING

## 2019-03-25 PROCEDURE — 17000 DESTRUCT PREMALG LESION: CPT

## 2019-03-25 PROCEDURE — 99214 OFFICE O/P EST MOD 30 MIN: CPT | Mod: 25

## 2019-03-25 PROCEDURE — 17003 DESTRUCT PREMALG LES 2-14: CPT

## 2019-03-25 PROCEDURE — ? SUNSCREEN RECOMMENDATIONS

## 2019-03-25 PROCEDURE — ? LIQUID NITROGEN

## 2019-03-25 ASSESSMENT — LOCATION DETAILED DESCRIPTION DERM
LOCATION DETAILED: RIGHT INFERIOR UPPER BACK
LOCATION DETAILED: RIGHT RADIAL DORSAL HAND
LOCATION DETAILED: RIGHT MEDIAL FOREHEAD
LOCATION DETAILED: LEFT RADIAL DORSAL HAND
LOCATION DETAILED: RIGHT PROXIMAL DORSAL FOREARM
LOCATION DETAILED: LEFT SUPERIOR CENTRAL MALAR CHEEK
LOCATION DETAILED: LEFT VENTRAL PROXIMAL FOREARM
LOCATION DETAILED: RIGHT INFERIOR MEDIAL FOREHEAD
LOCATION DETAILED: INFERIOR THORACIC SPINE
LOCATION DETAILED: LEFT SUPERIOR HELIX
LOCATION DETAILED: SUPERIOR THORACIC SPINE
LOCATION DETAILED: RIGHT CENTRAL MALAR CHEEK
LOCATION DETAILED: RIGHT MEDIAL UPPER BACK
LOCATION DETAILED: RIGHT VENTRAL DISTAL FOREARM
LOCATION DETAILED: LEFT DISTAL DORSAL FOREARM
LOCATION DETAILED: RIGHT SUPERIOR HELIX
LOCATION DETAILED: RIGHT DISTAL DORSAL FOREARM
LOCATION DETAILED: LEFT PROXIMAL DORSAL FOREARM

## 2019-03-25 ASSESSMENT — LOCATION SIMPLE DESCRIPTION DERM
LOCATION SIMPLE: LEFT FOREARM
LOCATION SIMPLE: RIGHT HAND
LOCATION SIMPLE: UPPER BACK
LOCATION SIMPLE: RIGHT FOREHEAD
LOCATION SIMPLE: RIGHT UPPER BACK
LOCATION SIMPLE: RIGHT EAR
LOCATION SIMPLE: RIGHT CHEEK
LOCATION SIMPLE: RIGHT FOREARM
LOCATION SIMPLE: LEFT CHEEK
LOCATION SIMPLE: LEFT EAR
LOCATION SIMPLE: LEFT HAND

## 2019-03-25 ASSESSMENT — LOCATION ZONE DERM
LOCATION ZONE: EAR
LOCATION ZONE: HAND
LOCATION ZONE: ARM
LOCATION ZONE: TRUNK
LOCATION ZONE: FACE

## 2019-03-25 NOTE — PROCEDURE: LIQUID NITROGEN
Detail Level: Detailed
Duration Of Freeze Thaw-Cycle (Seconds): 10
Post-Care Instructions: I reviewed with the patient in detail post-care instructions. Patient is to wear sunprotection, and avoid picking at any of the treated lesions. Pt may apply Vaseline to crusted or scabbing areas.
Number Of Freeze-Thaw Cycles: 2 freeze-thaw cycles
Consent: The patient's consent was obtained including but not limited to risks of crusting, scabbing, blistering, scarring, darker or lighter pigmentary change, recurrence, incomplete removal and infection. RTC in 2 months if lesion(s) persistent.
Render Post-Care Instructions In Note?: yes

## 2019-09-23 ENCOUNTER — APPOINTMENT (RX ONLY)
Dept: URBAN - METROPOLITAN AREA CLINIC 20 | Facility: CLINIC | Age: 74
Setting detail: DERMATOLOGY
End: 2019-09-23

## 2019-09-23 DIAGNOSIS — L82.1 OTHER SEBORRHEIC KERATOSIS: ICD-10-CM

## 2019-09-23 DIAGNOSIS — L81.4 OTHER MELANIN HYPERPIGMENTATION: ICD-10-CM

## 2019-09-23 DIAGNOSIS — L82.0 INFLAMED SEBORRHEIC KERATOSIS: ICD-10-CM

## 2019-09-23 DIAGNOSIS — Z71.89 OTHER SPECIFIED COUNSELING: ICD-10-CM

## 2019-09-23 DIAGNOSIS — L57.0 ACTINIC KERATOSIS: ICD-10-CM

## 2019-09-23 DIAGNOSIS — D22 MELANOCYTIC NEVI: ICD-10-CM

## 2019-09-23 DIAGNOSIS — D18.0 HEMANGIOMA: ICD-10-CM

## 2019-09-23 PROBLEM — D22.62 MELANOCYTIC NEVI OF LEFT UPPER LIMB, INCLUDING SHOULDER: Status: ACTIVE | Noted: 2019-09-23

## 2019-09-23 PROBLEM — D22.61 MELANOCYTIC NEVI OF RIGHT UPPER LIMB, INCLUDING SHOULDER: Status: ACTIVE | Noted: 2019-09-23

## 2019-09-23 PROBLEM — D18.01 HEMANGIOMA OF SKIN AND SUBCUTANEOUS TISSUE: Status: ACTIVE | Noted: 2019-09-23

## 2019-09-23 PROBLEM — D22.5 MELANOCYTIC NEVI OF TRUNK: Status: ACTIVE | Noted: 2019-09-23

## 2019-09-23 PROCEDURE — 99214 OFFICE O/P EST MOD 30 MIN: CPT | Mod: 25

## 2019-09-23 PROCEDURE — ? LIQUID NITROGEN

## 2019-09-23 PROCEDURE — 17000 DESTRUCT PREMALG LESION: CPT

## 2019-09-23 PROCEDURE — ? COUNSELING

## 2019-09-23 PROCEDURE — 17003 DESTRUCT PREMALG LES 2-14: CPT

## 2019-09-23 PROCEDURE — ? ADDITIONAL NOTES

## 2019-09-23 PROCEDURE — ? SUNSCREEN RECOMMENDATIONS

## 2019-09-23 PROCEDURE — ? PHOTODYNAMIC THERAPY COUNSELING

## 2019-09-23 ASSESSMENT — LOCATION ZONE DERM
LOCATION ZONE: SCALP
LOCATION ZONE: TRUNK
LOCATION ZONE: LEG
LOCATION ZONE: HAND
LOCATION ZONE: ARM
LOCATION ZONE: NECK
LOCATION ZONE: FACE

## 2019-09-23 ASSESSMENT — LOCATION SIMPLE DESCRIPTION DERM
LOCATION SIMPLE: RIGHT TEMPLE
LOCATION SIMPLE: SCALP
LOCATION SIMPLE: RIGHT EYEBROW
LOCATION SIMPLE: LEFT HAND
LOCATION SIMPLE: LEFT FOREARM
LOCATION SIMPLE: RIGHT UPPER BACK
LOCATION SIMPLE: RIGHT FOREHEAD
LOCATION SIMPLE: RIGHT SCALP
LOCATION SIMPLE: RIGHT FOREARM
LOCATION SIMPLE: RIGHT HAND
LOCATION SIMPLE: NECK
LOCATION SIMPLE: LEFT FOREHEAD
LOCATION SIMPLE: LEFT SCALP
LOCATION SIMPLE: UPPER BACK
LOCATION SIMPLE: RIGHT THIGH

## 2019-09-23 ASSESSMENT — LOCATION DETAILED DESCRIPTION DERM
LOCATION DETAILED: LEFT MEDIAL FRONTAL SCALP
LOCATION DETAILED: SUPERIOR THORACIC SPINE
LOCATION DETAILED: RIGHT VENTRAL DISTAL FOREARM
LOCATION DETAILED: RIGHT INFERIOR UPPER BACK
LOCATION DETAILED: LEFT CENTRAL LATERAL NECK
LOCATION DETAILED: LEFT PROXIMAL DORSAL FOREARM
LOCATION DETAILED: RIGHT PROXIMAL DORSAL FOREARM
LOCATION DETAILED: RIGHT RADIAL DORSAL HAND
LOCATION DETAILED: RIGHT MEDIAL TEMPLE
LOCATION DETAILED: RIGHT SUPERIOR PARIETAL SCALP
LOCATION DETAILED: RIGHT ANTERIOR MEDIAL DISTAL THIGH
LOCATION DETAILED: RIGHT CENTRAL LATERAL NECK
LOCATION DETAILED: LEFT VENTRAL PROXIMAL FOREARM
LOCATION DETAILED: RIGHT INFERIOR MEDIAL FOREHEAD
LOCATION DETAILED: INFERIOR THORACIC SPINE
LOCATION DETAILED: LEFT FOREHEAD
LOCATION DETAILED: RIGHT MEDIAL UPPER BACK
LOCATION DETAILED: LEFT RADIAL DORSAL HAND
LOCATION DETAILED: RIGHT LATERAL EYEBROW
LOCATION DETAILED: RIGHT CENTRAL PARIETAL SCALP
LOCATION DETAILED: RIGHT MEDIAL FRONTAL SCALP
LOCATION DETAILED: LEFT SUPERIOR LATERAL NECK

## 2019-09-23 NOTE — PROCEDURE: LIQUID NITROGEN
Number Of Freeze-Thaw Cycles: 2 freeze-thaw cycles
Render Post-Care Instructions In Note?: yes
Detail Level: Detailed
Consent: The patient's consent was obtained including but not limited to risks of crusting, scabbing, blistering, scarring, darker or lighter pigmentary change, recurrence, incomplete removal and infection. RTC in 2 months if lesion(s) persistent.
Render Note In Bullet Format When Appropriate: No
Post-Care Instructions: I reviewed with the patient in detail post-care instructions. Patient is to wear sunprotection, and avoid picking at any of the treated lesions. Pt may apply Vaseline to crusted or scabbing areas.
Duration Of Freeze Thaw-Cycle (Seconds): 10

## 2019-09-23 NOTE — PROCEDURE: ADDITIONAL NOTES
Additional Notes: Authorization has been submitted for red light for face, ears and neck
Detail Level: Detailed

## 2021-01-21 ENCOUNTER — HOSPITAL ENCOUNTER (OUTPATIENT)
Dept: HOSPITAL 8 - CVU | Age: 76
Discharge: HOME | End: 2021-01-21
Attending: INTERNAL MEDICINE
Payer: MEDICARE

## 2021-01-21 DIAGNOSIS — Z87.891: ICD-10-CM

## 2021-01-21 DIAGNOSIS — Z82.49: ICD-10-CM

## 2021-01-21 DIAGNOSIS — I11.9: ICD-10-CM

## 2021-01-21 DIAGNOSIS — I08.8: Primary | ICD-10-CM

## 2021-01-21 PROCEDURE — 93306 TTE W/DOPPLER COMPLETE: CPT

## 2021-03-19 ENCOUNTER — OFFICE VISIT (OUTPATIENT)
Dept: NEUROLOGY | Facility: MEDICAL CENTER | Age: 76
End: 2021-03-19
Attending: PSYCHIATRY & NEUROLOGY
Payer: MEDICARE

## 2021-03-19 VITALS
DIASTOLIC BLOOD PRESSURE: 86 MMHG | TEMPERATURE: 99 F | OXYGEN SATURATION: 95 % | HEART RATE: 78 BPM | RESPIRATION RATE: 12 BRPM | BODY MASS INDEX: 29.3 KG/M2 | HEIGHT: 68 IN | SYSTOLIC BLOOD PRESSURE: 142 MMHG | WEIGHT: 193.34 LBS

## 2021-03-19 DIAGNOSIS — M54.50 CHRONIC RIGHT-SIDED LOW BACK PAIN WITHOUT SCIATICA: ICD-10-CM

## 2021-03-19 DIAGNOSIS — G89.29 CHRONIC RIGHT-SIDED LOW BACK PAIN WITHOUT SCIATICA: ICD-10-CM

## 2021-03-19 DIAGNOSIS — N40.1 BENIGN PROSTATIC HYPERPLASIA WITH LOWER URINARY TRACT SYMPTOMS, SYMPTOM DETAILS UNSPECIFIED: ICD-10-CM

## 2021-03-19 DIAGNOSIS — G20.A1 PARKINSON'S DISEASE (HCC): ICD-10-CM

## 2021-03-19 PROCEDURE — 99211 OFF/OP EST MAY X REQ PHY/QHP: CPT | Performed by: PSYCHIATRY & NEUROLOGY

## 2021-03-19 PROCEDURE — 99204 OFFICE O/P NEW MOD 45 MIN: CPT | Performed by: PSYCHIATRY & NEUROLOGY

## 2021-03-19 RX ORDER — OMEPRAZOLE 40 MG/1
40 CAPSULE, DELAYED RELEASE ORAL
COMMUNITY
Start: 2021-02-01

## 2021-03-19 RX ORDER — TAMSULOSIN HYDROCHLORIDE 0.4 MG/1
0.4 CAPSULE ORAL
COMMUNITY
Start: 2021-02-23 | End: 2021-09-22

## 2021-03-19 RX ORDER — HYDROCHLOROTHIAZIDE 25 MG/1
25 TABLET ORAL
COMMUNITY
Start: 2021-01-23 | End: 2021-09-22

## 2021-03-19 NOTE — PATIENT INSTRUCTIONS
Start carbidopa/levodopa 1 tab 3x/day (about every 6 hours while awake).     Parkinson's Disease  Parkinson's disease is a type of movement disorder. It is a long-term condition that gets worse over time (is progressive). Each person with Parkinson's disease is affected differently.  This condition limits your ability to control movements and move your body normally. The condition can range from mild to severe. Parkinson's disease tends to get worse slowly over several years.  What are the causes?  Parkinson's disease results from a loss of brain cells (neurons) that make a brain chemical called dopamine. Dopamine is needed to control movement. As the condition gets worse, neurons make less dopamine. This makes it hard to move or control your movements.  The exact cause of the loss of neurons and why they make less dopamine is not known. Factors related to genes and the environment may contribute to the cause of Parkinson's disease.  What increases the risk?  The following factors may make you more likely to develop this condition:  · Being male.  · Being age 60 or older.  · Having a family history of Parkinson's disease.  · Having had a traumatic brain injury.  · Having experienced depression.  · Having been exposed to toxins, such as pesticides.  What are the signs or symptoms?  Symptoms of this condition can vary. The main symptoms are related to movement. These include:  · A tremor or shaking while you are resting that you cannot control.  · Stiffness in your neck, arms, and legs (rigidity).  · Slowing of movement. You may lose facial expressions and have trouble making small movements that are needed to button clothing or brush your teeth.  · An abnormal walk. You may walk with short, shuffling steps.  · Loss of balance and stability when standing. You may sway, fall backward, and have trouble making turns.  Other symptoms include:  · Mental or cognitive changes including depression, anxiety, having false  beliefs (delusions), or seeing, hearing, or feeling things that do not exist (hallucinations).  · Trouble speaking or swallowing.  · Changes in bowel or bladder functions including constipation, having to go urgently or frequently, or not being able to control your bowel or bladder.  · Changes in sleep habits or trouble sleeping.  Parkinson's disease may be graded by severity of your condition as mild, moderate, or advanced. Parkinson's disease progression is different for everyone. You may not progress to the advanced stage.  · Mild Parkinson's disease involves:  ? Movement problems that do not affect daily activities.  ? Movement problems on one side of the body.  · Moderate Parkinson's disease involves:  ? Movement problems on both sides of the body.  ? Slowing of movement.  ? Coordination and balance problems.  · Advanced Parkinson's disease involves:  ? Extreme difficulty walking.  ? Inability to live alone safely.  ? Signs of dementia, such as having trouble remembering things, doing daily tasks such as getting dressed, and problem solving.  How is this diagnosed?  This condition is diagnosed by a specialist. A diagnosis may be made based on symptoms, your medical history, and a physical exam. You may also have brain imaging tests to check for a loss of dopamine-producing areas of the brain.  How is this treated?  There is no cure for Parkinson's disease. Treatment focuses on managing your symptoms. Treatment may include:  · Medicines. Everyone responds to medicines differently. Your response may change over time. Work with your health care provider to find the best medicines for you.  · Speech, occupational, and physical therapy.  · Deep brain stimulation surgery to reduce tremors and other involuntary movements.  Follow these instructions at home:  Medicines  · Take over-the-counter and prescription medicines only as told by your health care provider.  · Avoid taking medicines that can affect thinking, such  as pain or sleeping medicines.  Eating and drinking  · Follow instructions from your health care provider about eating or drinking restrictions.  · Do not drink alcohol.  Activity  · Talk with your health care provider about if it is safe for you to drive.  · Do exercises as told by your health care provider or physical therapist.  Lifestyle         · Install grab bars and railings in your home to prevent falls.  · Do not use any products that contain nicotine or tobacco, such as cigarettes, e-cigarettes, and chewing tobacco. If you need help quitting, ask your health care provider.  · Consider joining a support group for people with Parkinson's disease.  General instructions  · Work with your health care provider to determine what you need help with and what your safety needs are.  · Keep all follow-up visits as told by your health care provider, including any visits with a physical therapist, speech therapist, or occupational therapist. This is important.  Contact a health care provider if:  · Medicines do not help your symptoms.  · You are unsteady or have fallen at home.  · You need more support to function well at home.  · You have trouble swallowing.  · You have severe constipation.  · You are having problems with side effects from your medicines.  · You feel confused, anxious, or depressed.  Get help right away if you:  · Are injured after a fall.  · See or hear things that are not real.  · Cannot swallow without choking.  · Have chest pain or trouble breathing.  · Do not feel safe at home.  · Have thoughts about hurting yourself or others.  If you ever feel like you may hurt yourself or others, or have thoughts about taking your own life, get help right away. You can go to your nearest emergency department or call:  · Your local emergency services (911 in the [place]  · A suicide crisis helpline, such as the National Suicide Prevention Lifeline at 1-841.304.6184. This is open 24 hours a  day.  Summary  · Parkinson's disease is a long-term condition that gets worse over time. This condition limits your ability to control your movements and move your body normally.  · There is no cure for Parkinson's disease. Treatment focuses on managing your symptoms.  · Work with your health care provider to determine what you need help with and what your safety needs are.  · Keep all follow-up visits as told by your health care provider, including any visits with a physical therapist, speech therapist, or occupational therapist. This is important.  This information is not intended to replace advice given to you by your health care provider. Make sure you discuss any questions you have with your health care provider.  Document Released: 12/15/2001 Document Revised: 03/05/2020 Document Reviewed: 03/05/2020  [employer] Patient Education © 2020 [employer]

## 2021-03-19 NOTE — PROGRESS NOTES
Chief Complaint   Patient presents with   • New Patient     Tremor       History of present illness:  Ronak Sampson 75 y.o. right handed male with right arm tremor and poor coordination first noted after cervical spine ablation therapy. He has a diagnosis of BPH on tamsulosin.    He has to think about tying his shoes or using his right arm. There is no problem with the right leg per patient but wife endorses that he shuffles occasionally around the house.   His wife has noticed that he is quieter. No change in facial expression per wife.     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    Sleep: Is up half the night per wife. Does not use anything.    Constipation: No problems.   Urination: On tamsulosin.    Dizziness: No   Hallucinations: No   Cognition: No   Balance: No recent falls.  Pain: He has pain in the low mid right back. This is constantly present and aching.     Past medical history:   No past medical history on file.    Past surgical history:   Past Surgical History:   Procedure Laterality Date   • COLONOSCOPY - ENDO  2/28/2016    Procedure: COLONOSCOPY - ENDO;  Surgeon: Lee Powers M.D.;  Location: [facility];  Service:    • GASTROSCOPY WITH CLIPPING  2/26/2016    Procedure: GASTROSCOPY WITH CLIPPING;  Surgeon: Lee Powers M.D.;  Location: ENDOSCOPY [facility];  Service:    • GASTROSCOPY-ENDO N/A 2/25/2016    Procedure: GASTROSCOPY-ENDO;  Surgeon: Piyush Cole M.D.;  Location: ENDOSCOPY [facility];  Service:        Family history:   No family history on file.    Social history:   Social History     Socioeconomic History   • Marital status:      Spouse name: Not on file   • Number of children: Not on file   • Years of education: Not on file   • Highest education level: Not on file   Occupational History   • Not on file   Tobacco Use   • Smoking status: Never Smoker   • Smokeless tobacco: Never Used   Substance and Sexual  "Activity   • Alcohol use: Not Currently   • Drug use: Not on file   • Sexual activity: Not on file   Other Topics Concern   • Not on file   Social History Narrative   • Not on file     Social Determinants of Health     Financial Resource Strain:    • Difficulty of Paying Living Expenses:    Food Insecurity:    • Worried About Running Out of Food in the Last Year:    • Ran Out of Food in the Last Year:    Transportation Needs:    • Lack of Transportation (Medical):    • Lack of Transportation (Non-Medical):    Physical Activity:    • Days of Exercise per Week:    • Minutes of Exercise per Session:    Stress:    • Feeling of Stress :    Social Connections:    • Frequency of Communication with Friends and Family:    • Frequency of Social Gatherings with Friends and Family:    • Attends [employer] Services:    • Active Member of Clubs or Organizations:    • Attends Club or Organization Meetings:    • Marital Status:    Intimate Partner Violence:    • Fear of Current or Ex-Partner:    • Emotionally Abused:    • Physically Abused:    • Sexually Abused:        Current medications:   Current Outpatient Medications   Medication   • hydroCHLOROthiazide (HYDRODIURIL) 25 MG Tab   • omeprazole (PRILOSEC) 40 MG delayed-release capsule   • tamsulosin (FLOMAX) 0.4 MG capsule   • tramadol (ULTRAM) 50 MG Tab   • acetaminophen (TYLENOL) 500 MG Tab     No current facility-administered medications for this visit.       Medication Allergy:  No Known Allergies    Physical examination:   Vitals:    03/19/21 0944   BP: 142/86   BP Location: Left arm   Patient Position: Sitting   BP Cuff Size: Adult   Pulse: 78   Resp: 12   Temp: 37.2 °C (99 °F)   TempSrc: Temporal   SpO2: 95%   Weight: 87.7 kg (193 lb 5.5 oz)   Height: 1.737 m (5' 8.4\")     UNIFIED PARKINSON'S DISEASE RATING SCALE PART III: MOTOR EXAMINATION    The patient is NOT on medication for treating the symptoms of Parkinson's disease  The patient's clinical state is: OFF  Last L-DOPA " dose: N/A     Speech: 1: Slight, loss of modulation, diction, or volume, but still all words easy to understand  Facial expression: 2: Mild - In addition to decreased eye-blink frequency, masked facies present in the lower face, but lips not parted.  Rest tremor amplitude    Lip/jaw: 0: Normal - No tremor.    RUE: 2: Mild: > 1 cm but < 3 cm in maximal amplitude.    LUE: 0: Normal - No tremor.     RLE: 2: Mild: > 1 cm but < 3 cm in maximal amplitude.     LLE: 0: Normal - No tremor.    Postural tremor of the hands   Right: 1: Slight - Tremor is present but less than 1 cm in amplitude.   Left: 0: Normal - No tremor.   Kinetic tremor of the hands    Right: 0: Normal - No tremor.    Left: 0: Normal - No tremor.   Rigidity    Neck: 2: Mild - rigidity detected without the activation maneuver, but full range of motion is easily achieved.   RUE: 2: Mild - rigidity detected without the activation maneuver, but full range of motion is easily achieved.   LUE: 2: Mild - rigidity detected without the activation maneuver, but full range of motion is easily achieved.   RLE: 2: Mild - rigidity detected without the activation maneuver, but full range of motion is easily achieved.   LLE: 0: Normal - no rigidity  Finger tapping    Right: 3: Moderate - any of the following: a) more than 5 interruptions during tapping or at least one longer arrest (freeze) in ongoing movement; b) moderate slowing; c) the amplitude decrements starting after the 1st tap.    Left: 2: Mild - any of the following: a) 3 to 5 interruptions during tapping; b) mild slowing; c) the amplitude decrements midway in the 10-tap sequence.  Hand movements   Right: 3: Moderate - any of the following: a) more than 5 interruptions during the movement or at least one longer arrest (freeze) in ongoing movement; b) moderate slowing; c) the amplitude decrements after the 1st open-and-close sequence.     Left: 1: Slight - any of the following: a) the regular rhythm is broken  with one or two interruptions or hesitations of the movement; b) slight slowing; c) the amplitude decrements near the end of the task.   Pronation-supination movements of hands    Right: 2: Mild- any of the following: a) 3 to 5 interruptions during the movements; b) mild slowing; c) the amplitude decrements midway in the sequence.     Left: 1: Slight -any of the following: a) the regular rhythm is broken with one or two interruptions or hesitations of the movement; b) slight slowing; c) the amplitude decrements near the end of the sequence.   Toe tapping    Right: 1: Slight - any of the following: a) the regular rhythm is broken with one or two interruptions or hesitations of the tapping movement; b) slight slowing; c) amplitude decrements near the end of the ten taps.    Left: 1: Slight - any of the following: a) the regular rhythm is broken with one or two interruptions or hesitations of the tapping movement; b) slight slowing; c) amplitude decrements near the end of the ten taps.   Leg agility   Right: 1: Slight - any of the following: a) the regular rhythm is broken with one or two interruptions or hesitations of the movement; b) slight slowing; c) amplitude decrements near the end of the task.    Left: 1: Slight - any of the following: a) the regular rhythm is broken with one or two interruptions or hesitations of the movement; b) slight slowing; c) amplitude decrements near the end of the task.   Arising from chair: 2: Mild - pushes self up from the arms of the chair without difficulty.  Posture: 2: Mild - Definite flexion, scoliosis or leaning to one side, but patient can correct posture when asked to do so.   Gait: 2: Mild - Independent walking but with substantial gait impairment.  Freezing of gait: 0: Normal - no freezing.  Postural stability: 3: Moderate - Stands safely, but with absence of postural response; falls if not caught by examiner.   Body bradykinesia: 2: Mild - Mild global slowness and poverty  of spontaneous movements.  Constancy of rest tremor: 3: Moderate - Tremor at rest is present 51-75% of the entire examination period.    Were dyskinesias present during examination? No     Oren and Yahr Stage: 3: Mild to moderate involvement; some postural instability but physically independent; needs assistance to recover from pull test.     Labs:  None     Imaging:   None     ASSESSMENT AND PLAN:  Problem List Items Addressed This Visit     Parkinson's disease (HCC)    Chronic right-sided low back pain without sciatica      Other Visit Diagnoses     Benign prostatic hyperplasia with lower urinary tract symptoms, symptom details unspecified        Relevant Medications    tamsulosin (FLOMAX) 0.4 MG capsule          1. Parkinson's disease (HCC)  75 year old male with bradykinesia and right arm/leg rest tremor consistent with Parkinson's disease. Start carbidopa/levodopa 25/100 1 tab 3x/day.     2. Chronic right-sided low back pain without sciatica  I have counseled the patient that low back pain is common in PD and the description sounds musculoskeletal rather than radiculopathy.     3. Benign prostatic hyperplasia with lower urinary tract symptoms, symptom details unspecified  Given research studies showing promise with disease slowing activity with terazosin, I have recommended that he contact his PCP to switch tamsulosin to terazosin.     FOLLOW-UP:   No follow-ups on file.    Total time spent for the day for this patient is: 25 minutes. I spent 22 minutes in face to face time and I spent 3 minutes pre-charting and 0 minutes in post-visit documentation.      Dr. Inderjit Jimenez D.O.  [facility] Neu[facility]   Movement Disorders Specialist

## 2021-04-27 ENCOUNTER — PHYSICAL THERAPY (OUTPATIENT)
Dept: PHYSICAL THERAPY | Facility: REHABILITATION | Age: 76
End: 2021-04-27
Attending: PSYCHIATRY & NEUROLOGY
Payer: MEDICARE

## 2021-04-27 DIAGNOSIS — G20.A1 PARKINSON'S DISEASE (HCC): ICD-10-CM

## 2021-04-27 PROCEDURE — 97163 PT EVAL HIGH COMPLEX 45 MIN: CPT

## 2021-04-27 ASSESSMENT — ENCOUNTER SYMPTOMS
PAIN SCALE AT HIGHEST: 10
PAIN SCALE: 0
EXACERBATED BY: LIFTING
QUALITY: ACHING
EXACERBATED BY: OVERHEAD ACTIVITY
PAIN SCALE AT LOWEST: 0
QUALITY: SHARP
QUALITY: NUMBNESS
EXACERBATED BY: HOUSEWORK

## 2021-04-27 ASSESSMENT — BALANCE ASSESSMENTS
BALANCE - SITTING DYNAMIC: NORMAL
BALANCE - STANDING STATIC: NORMAL
BALANCE - STANDING DYNAMIC: GOOD
BALANCE - SITTING STATIC: NORMAL
WEIGHT SHIFT SITTING: NORMAL
WEIGHT SHIFT STANDING: NORMAL

## 2021-04-27 ASSESSMENT — ACTIVITIES OF DAILY LIVING (ADL): POOR_BALANCE: 1

## 2021-04-27 NOTE — OP THERAPY EVALUATION
Outpatient Physical Therapy  INITIAL NEUROLOGICAL EVALUATION    [facility] Outpatient Physical Therapy [place] [id number] [facility], [address]  [place] [address]  Phone:  [phone]    Date of Evaluation: 2021    Patient: Ronak Sampson  YOB: 1945  MRN: [id number]     Referring Provider: Americo Jimenez D.O.  [address]  [address]  [place] [address]   Referring Diagnosis Parkinson's disease (HCC) [G20]     Time Calculation    Start time: 1000  Stop time: 1100 Time Calculation (min): 60 minutes             Chief Complaint: No chief complaint on file.    Visit Diagnoses     ICD-10-CM   1. Parkinson's disease (HCC)  G20       Subjective:   History of Present Illness:     Mechanism of injury:  75 year old male with bradykinesia and right arm/leg rest tremor consistent with Parkinson's disease 1 month ago. Symptoms began 4-5 months ago.   Chronic cervical and lower thoracic pain x 5 years ago.   Start carbidopa/levodopa 25/100 1 tab 3x/day. 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  S/P ablation 2020, T10 ablation .  Both have significantly helped with headache and spine pain.    One fall on a wet mat several weeks ago no injury, occasionally slips on rocks.  no shuffling per patient, no difficulty speaking or swallowing.  Activity:  Increased weakness with dropping items with right , difficulty using right hand and arm , has to concentrate more and handwriting has become smaller .  Been trying to use 10 l weights to strengthen arm-not working     PMH: GERD, DISH, hernia surgery, esophageal surgery, BPH-urinary urgency, taking tramadol for back pain    Headaches:  no headaches  Sleep disturbance:  Difficulty falling asleep and interrupted sleep  Pain:     Current pain ratin    At best pain ratin    At worst pain rating:  10    Location:  Right arm sensation-numbness, thoracic spine    Quality:  Numbness, aching  and sharp    Aggravating factors:  Housework, lifting and overhead activity  Hand dominance:  Right  Treatments:     Previous treatment:  Medication  Patient Goals:     Patient goals for therapy:  Return to sport/leisure activities, increased motion, increased strength and independence with ADLs/IADLs      No past medical history on file.  Past Surgical History:   Procedure Laterality Date   • COLONOSCOPY - ENDO  2/28/2016    Procedure: COLONOSCOPY - ENDO;  Surgeon: Lee Powers M.D.;  Location: [facility];  Service:    • GASTROSCOPY WITH CLIPPING  2/26/2016    Procedure: GASTROSCOPY WITH CLIPPING;  Surgeon: Lee Powers M.D.;  Location: ENDOSCOPY [facility];  Service:    • GASTROSCOPY-ENDO N/A 2/25/2016    Procedure: GASTROSCOPY-ENDO;  Surgeon: Piyush Cole M.D.;  Location: ENDOSCOPY [facility];  Service:      Social History     Tobacco Use   • Smoking status: Never Smoker   • Smokeless tobacco: Never Used   Substance Use Topics   • Alcohol use: Not Currently     Family and Occupational History     Socioeconomic History   • Marital status:      Spouse name: Not on file   • Number of children: Not on file   • Years of education: Not on file   • Highest education level: Not on file   Occupational History   • Not on file       Objective:   Active Range of Motion:   Upper extremity (left):     All left upper extremity active range of motion: All within functional limits  Upper extremity (right):     All right upper extremity active range of motion: All within functional limits  Lower extremity (left):     All left lower extremity active range of motion: All within functional limits  Lower extremity (right):     All right lower extremity active range of motion: All within functional limits  Active range of motion comments: Posture: Right lateral shift   Shoulder right AROM 0-110 flexion, 0-130 abduction, 0-60 ER          Tone, Sensation and Coordination:   Tone:     Left  upper extremity muscle tone: Rigid and Intentional tremors    Right upper extremity muscle tone: Rigid and Resting tremors    Left lower extremity muscle tone: Rigid    Right lower extremity muscle tone: Rigid    Coordination   Upper extremity (left):     Fine motor: Impaired    Slow alternating movements: Impaired    Rapid alternating movements: Impaired    Finger to finger: Impaired    Finger touch to nose: Within functional limits  Upper extremity (right):     Fine motor: Impaired    Gross motor: Impaired    Slow alternating movements: Impaired    Finger to finger: Impaired    Finger touch to nose: Within functional limits    Cognition:     Cognition Comments:  Intact    Postural Control (Balance)     Sitting (static): Normal    Sitting (dynamic): Normal    Standing (static): Normal    Standing (dynamic): Good    Weight shift (sitting): Normal    Weight shift (standing): Normal    Postural control (balance) comments:  - Pull test 1-2 steps to recover with posterior pull @ Indep  Mini Best 23/28    Compensatory stepping backwardmore than one step to recover balance  One leg balance right < 10 sec, left > 20 sec  Walk with pivot turn: 4 steps  TUG + dual tasking 6.89 TUG> 6.10        Activities of Daily Living:     Dressing:     Dressing comments:   Difficulty don/doffing clothes, jacket and shirt      Exercises/Treatment  Time-based treatments/modalities:           Assessment, Response and Plan:   Impairments: activity intolerance and impaired balance    Assessment details:  Patient presents with recent Parkinsons diagnosis and 4 month onset of increased right UE/LE tremor, rigidity and weakness right arm.  Symptoms make it difficult to dress, button and use right UE for ADLS including carrying and lifting.  Patient demonstrates decreased arm swing and trunk rotation with gait, postural instability with backward perturbation,  increased rigidity bilateral UE and right LE, decreased amplitude of movement right UE >  left and impaired coordination right UE with rapid alternating movement and fine motor tasks. Patient also demonstrated decreased speed with TUG when dual tasking.   Patient  presents with a right lateral trunk shift which may be caused by chronic thoracic (T10) and cervical DISH.  Patient will benefit from skilled PT to meet goals stated below.   Barriers to therapy:  Comorbidities  Prognosis: good    Goals:   Short Term Goals:   Patient able to dress and button with 50% increased ease and time  Patient able to use right UE for ADLS using increased amplitude of movement > 50% of the time  Short term goal time span:  2-4 weeks      Long Term Goals:    Patient able to perform ADLS using right UE with >75% improved speed and amplitude >50% of the time  Patient able to demonstrate improved internal cueing with gait using high amplitude and improved trunk rotation and arm swing > 50% of the time    Plan:   Therapy options:  Physical therapy treatment to continue  Planned therapy interventions:  Neuromuscular Re-education (CPT 71727), Manual Therapy (CPT 28811), Gait Training (CPT 74835), Functional Training, Self Care (CPT 30828), Therapeutic Exercise (CPT 37027) and Therapeutic Activities (CPT 30686)  Frequency:  2x week  Duration in weeks:  8  Discussed with:  Patient  Plan details:  1-2 x week x 8 weeks      Functional Assessment Used  PT Functional Assessment Tool Used: Mini Best  PT Functional Assessment Score: 23/28       Referring provider co-signature:  I have reviewed this plan of care and my co-signature certifies the need for services.    Certification Period: 04/27/2021 to  06/22/21    Physician Signature: ________________________________ Date: ______________

## 2021-05-04 ENCOUNTER — PHYSICAL THERAPY (OUTPATIENT)
Dept: PHYSICAL THERAPY | Facility: REHABILITATION | Age: 76
End: 2021-05-04
Attending: PSYCHIATRY & NEUROLOGY
Payer: MEDICARE

## 2021-05-04 DIAGNOSIS — G20.A1 PARKINSON'S DISEASE (HCC): ICD-10-CM

## 2021-05-04 PROCEDURE — 97112 NEUROMUSCULAR REEDUCATION: CPT

## 2021-05-04 PROCEDURE — 97110 THERAPEUTIC EXERCISES: CPT

## 2021-05-04 PROCEDURE — 97116 GAIT TRAINING THERAPY: CPT

## 2021-05-04 NOTE — OP THERAPY DAILY TREATMENT
Outpatient Physical Therapy  DAILY TREATMENT     [facility] Outpatient Physical Therapy [place] [address] [facility], [address]  [place] [address]  Phone:  [phone]    Date: 05/04/2021    Patient: Ronak Sampson  YOB: 1945  MRN: [id number]     Time Calculation    Start time: 1030  Stop time: 1130 Time Calculation (min): 60 minutes         Chief Complaint: No chief complaint on file.    Visit #: 2    SUBJECTIVE: no pain today, right arm remains stiff and weak      OBJECTIVE:  Current objective measures:           Therapeutic Exercises (CPT 23200):     1. Thread the needle, x 15    2. Wall angels, x 20    3. Alt shoulder flexion, horizontal abduction, x 20    4. Nu step L2.5, 12 min    5. Open book thoracic rotation extension, x 15    6. Wand assisted overhead shoulder flexion    Therapeutic Treatments and Modalities:     1. Neuromuscular Re-education (CPT 89560), see below    2. Gait Training (CPT 10441), 4 x 150 feet with focus on trunk rotation and right shoulder extension/swing, no lo    Therapeutic Treatment and Modalities Summary: LSVT Maximal Daily exercises  1.  Floor to ceiling 8 reps  2.  Side to Side (seated) 8 reps each side        Time-based treatments/modalities:    Physical Therapy Timed Treatment Charges  Gait training minutes (CPT 92101): 10 minutes  Neuromusc re-ed, balance, coor, post minutes (CPT 57858): 15 minutes  Therapeutic exercise minutes (CPT 96170): 30 minutes    ASSESSMENT:   Response to treatment: Patient demonstrated good improvement with AROM right shoulder post treatment. Decreased arm swing/shoulder extension with gait , improved thoracic rotation post there ex.  Progress maximal daily exercises and posture stability next session    PLAN/RECOMMENDATIONS:   Plan for treatment: therapy treatment to continue next visit.  Planned interventions for next visit: continue with current treatment.

## 2021-05-11 ENCOUNTER — PHYSICAL THERAPY (OUTPATIENT)
Dept: PHYSICAL THERAPY | Facility: REHABILITATION | Age: 76
End: 2021-05-11
Attending: PSYCHIATRY & NEUROLOGY
Payer: MEDICARE

## 2021-05-11 DIAGNOSIS — G20.A1 PARKINSON'S DISEASE (HCC): ICD-10-CM

## 2021-05-11 PROCEDURE — 97110 THERAPEUTIC EXERCISES: CPT

## 2021-05-11 PROCEDURE — 97140 MANUAL THERAPY 1/> REGIONS: CPT

## 2021-05-11 PROCEDURE — 97116 GAIT TRAINING THERAPY: CPT

## 2021-05-11 PROCEDURE — 97112 NEUROMUSCULAR REEDUCATION: CPT

## 2021-05-11 NOTE — OP THERAPY DAILY TREATMENT
Outpatient Physical Therapy  DAILY TREATMENT     [facility] Outpatient Physical Therapy [address], [address]  [place] [address]  Phone:  [phone]    Date: 05/11/2021    Patient: Ronak Sampson  YOB: 1945  MRN: [id number]     Time Calculation    Start time: 1100  Stop time: 1200 Time Calculation (min): 60 minutes         Chief Complaint: No chief complaint on file.    Visit #: 3    SUBJECTIVE:  Back is feeling better, no change in right shoulder, walked 2 x this week no loss of balance with ADLSs but difficulty putting socks on    OBJECTIVE:  Current objective measures:           Therapeutic Exercises (CPT 26084):     1. Thread the needle, x 15    2. Wall angels, x 20    3. Alt shoulder flexion, horizontal abduction, x 20    4. Nu step L4 11 min, 12 min    5. Open book thoracic rotation extension, x 15    6. Wand assisted overhead shoulder flexion, x20    Therapeutic Treatments and Modalities:     1. Neuromuscular Re-education (CPT 82574), see below    2. Gait Training (CPT 21568), 2 x 150 feet, 2 x 100 feet backward gait with focus on trunk rotation and right shoulder extension/swing, decreased step length with backwards gait    3. Manual Therapy (CPT 05910), thoracic rib springing and extension mobilization    Therapeutic Treatment and Modalities Summary:   1.  Floor to ceiling 8 reps  2.  Side to Side (seated) 8 reps each side  3. Forward step and reach (standing) 8 reps each side  4.  Sideways Step and Reach (standing) 8 reps each side  5.  Backwards Step and Reach (standing) 10 reps each side  6.  Forward Rock and Reach (standing)  10 reps each side  7.  Sideways Rock and Reach(standing) 10 Reps each side        Time-based treatments/modalities:    Physical Therapy Timed Treatment Charges  Gait training minutes (CPT 99548): 10 minutes  Manual therapy minutes (CPT 87517): 8 minutes  Neuromusc re-ed, balance, coor, post minutes (CPT 22564): 20 minutes  Therapeutic exercise minutes (CPT  77393): 20 minutes    ASSESSMENT:   Response to treatment: Patient demonstrating improved amplitude and carryover from last session with gait and LSVT max Daily exercises.     PLAN/RECOMMENDATIONS:   Plan for treatment: therapy treatment to continue next visit.  Planned interventions for next visit: continue with current treatment, gait training (CPT 53884), manual therapy (CPT 69963), neuromuscular re-education (CPT 96454) and therapeutic exercise (CPT 74453).

## 2021-05-17 ENCOUNTER — PHYSICAL THERAPY (OUTPATIENT)
Dept: PHYSICAL THERAPY | Facility: REHABILITATION | Age: 76
End: 2021-05-17
Attending: PSYCHIATRY & NEUROLOGY
Payer: MEDICARE

## 2021-05-17 DIAGNOSIS — G20.A1 PARKINSON'S DISEASE (HCC): ICD-10-CM

## 2021-05-17 PROCEDURE — 97112 NEUROMUSCULAR REEDUCATION: CPT

## 2021-05-17 PROCEDURE — 97116 GAIT TRAINING THERAPY: CPT

## 2021-05-17 PROCEDURE — 97110 THERAPEUTIC EXERCISES: CPT

## 2021-05-17 NOTE — OP THERAPY DAILY TREATMENT
Outpatient Physical Therapy  DAILY TREATMENT     [facility] [address], [address]  [place] [address]  Phone:  [phone]    Date: 05/17/2021    Patient: Ronak Sampson  YOB: 1945  MRN: [id number]     Time Calculation    Start time: 1400  Stop time: 1500 Time Calculation (min): 60 minutes         Chief Complaint: No chief complaint on file.    Visit #: 4    SUBJECTIVE:  Not feeling good secondary to new onset of low blood pressure from prostate meds x 1 month;BP 96/60.  Has not been doing home program. Still having difficulty with dressing     OBJECTIVE:  Current objective measures:           Therapeutic Exercises (CPT 04478):     1. Thread the needle, x 15    2. Wall angels, x 20    3. Alt shoulder flexion, horizontal abduction, x 20    4. Nu step L4 11 min, 12 min    5. Open book thoracic rotation extension, x 15    6. Wand assisted overhead shoulder flexion, x20    Therapeutic Treatments and Modalities:     1. Neuromuscular Re-education (CPT 54088), see below    2. Gait Training (CPT 32124), 2 x 150 feet, 2 x 100 feet backward gait with focus on trunk rotation and right shoulder extension/swing, decreased step length with backwards gait    3. Manual Therapy (CPT 47674), thoracic rib springing and extension mobilization    Therapeutic Treatment and Modalities Summary:   1.  Floor to ceiling 8 reps  2.  Side to Side (seated) 8 reps each side  3. Forward step and reach (standing) 8 reps each side  4.  Sideways Step and Reach (standing) 8 reps each side  5.  Backwards Step and Reach (standing) 10 reps each side  6.  Forward Rock and Reach (standing)  10 reps each side  7.  Sideways Rock and Reach(standing) 10 Reps each side        Time-based treatments/modalities:    Physical Therapy Timed Treatment Charges  Gait training minutes (CPT 17655): 10 minutes  Neuromusc re-ed, balance, coor, post minutes (CPT 99782): 20 minutes  Therapeutic exercise minutes (CPT 17228): 30  minutes    ASSESSMENT:   Response to treatment: Patient demonstrates good compliance with home program and improved amplitude with functional movement for short periods of time.    Patient's BP has been low secondary prostate meds which is causing him to feel bad.  Recommended that patient call Dr. Jimenez to discuss options and to monitor symptoms.       PLAN/RECOMMENDATIONS:   Plan for treatment: therapy treatment to continue next visit.  Planned interventions for next visit: continue with current treatment.

## 2021-05-25 ENCOUNTER — PHYSICAL THERAPY (OUTPATIENT)
Dept: PHYSICAL THERAPY | Facility: REHABILITATION | Age: 76
End: 2021-05-25
Attending: PSYCHIATRY & NEUROLOGY
Payer: MEDICARE

## 2021-05-25 DIAGNOSIS — G20.A1 PARKINSON'S DISEASE (HCC): ICD-10-CM

## 2021-05-25 PROCEDURE — 97116 GAIT TRAINING THERAPY: CPT

## 2021-05-25 PROCEDURE — 97110 THERAPEUTIC EXERCISES: CPT

## 2021-05-25 PROCEDURE — 97112 NEUROMUSCULAR REEDUCATION: CPT

## 2021-05-25 NOTE — OP THERAPY DAILY TREATMENT
Outpatient Physical Therapy  DAILY TREATMENT     [facility] Physical Therapy [address] [facility], [address]  [place] [address]  Phone:  [phone]    Date: 05/25/2021    Patient: Ronak Sampson  YOB: 1945  MRN: [id number]     Time Calculation    Start time: 1100  Stop time: 1200 Time Calculation (min): 60 minutes         Chief Complaint: No chief complaint on file.    Visit #: 5    SUBJECTIVE:  Back is improving,, arm is better , moving better, able to button shirt easier.     OBJECTIVE:  Current objective measures: right trunk shift, decreased balance with split stance weightbearing right when stepping backwards          Therapeutic Exercises (CPT 35207):     1. Thread the needle, x 15    2. Wall angels, x 20    3. Alt shoulder flexion, horizontal abduction, x 20    4. Nu step L4 11 min, 12 min    5. Open book thoracic rotation extension, x 15    6. Wand assisted overhead shoulder flexion, x20    7. Rows with green resistance band, x 20    8. Shoulder extension with green resistance band, x 20    Therapeutic Treatments and Modalities:     1. Neuromuscular Re-education (CPT 12536), see below    2. Gait Training (CPT 86602), 2 x 150 feet, 2 x 100 feet backward gait with focus on trunk rotation and right shoulder extension/swing, decreased step length with backwards gait    3. Manual Therapy (CPT 73236), thoracic rib springing and extension mobilization    Therapeutic Treatment and Modalities Summary:   1.  Floor to ceiling 8 reps  2.  Side to Side (seated) 8 reps each side  3. Forward step and reach (standing) 8 reps each side  4.  Sideways Step and Reach (standing) 8 reps each side  5.  Backwards Step and Reach (standing) 10 reps each side  6.  Forward Rock and Reach (standing)  10 reps each side  7.  Sideways Rock and Reach(standing) 10 Reps each side        Time-based treatments/modalities:    Physical Therapy Timed Treatment Charges  Gait training minutes (CPT 91172): 10 minutes  Neuromusc  re-ed, balance, coor, post minutes (CPT 85570): 20 minutes  Therapeutic exercise minutes (CPT 29294): 25 minutes      Pain rating (1-10) before treatment:  0  Pain rating (1-10) after treatment:  0    ASSESSMENT:   Response to treatment: patient demonstrating improved right UE AROM and upright posture with gait and there ex.  Slight impaired balance when stepping backward with right foot    PLAN/RECOMMENDATIONS:   Plan for treatment: therapy treatment to continue next visit.  Planned interventions for next visit: continue with current treatment, gait training (CPT 32478), manual therapy (CPT 68806), neuromuscular re-education (CPT 33245) and therapeutic exercise (CPT 13933).

## 2021-06-08 ENCOUNTER — PHYSICAL THERAPY (OUTPATIENT)
Dept: PHYSICAL THERAPY | Facility: REHABILITATION | Age: 76
End: 2021-06-08
Attending: PSYCHIATRY & NEUROLOGY
Payer: MEDICARE

## 2021-06-08 DIAGNOSIS — G20.A1 PARKINSON'S DISEASE (HCC): ICD-10-CM

## 2021-06-08 PROCEDURE — 97112 NEUROMUSCULAR REEDUCATION: CPT

## 2021-06-08 PROCEDURE — 97110 THERAPEUTIC EXERCISES: CPT

## 2021-06-08 PROCEDURE — 97116 GAIT TRAINING THERAPY: CPT

## 2021-06-08 NOTE — OP THERAPY DAILY TREATMENT
Outpatient Physical Therapy  DAILY TREATMENT     [facility] Outpatient Physical Therapy [place] [address] [facility], [address]  [place] [address]  Phone:  [phone]    Date: 06/08/2021    Patient: Ronak Sampson  YOB: 1945  MRN: [id number]     Time Calculation                   Chief Complaint: No chief complaint on file.    Visit #: 6    SUBJECTIVE:  Back hurts at the same time tremor increases, doing exercises daily    OBJECTIVE:  Current objective measures:           Therapeutic Exercises (CPT 78468):     1. Thread the needle with roller, x 15 b    2. Wall angels, x 20    3. Alt shoulder flexion, horizontal abduction, x 20    4. Nu step L4 10 min, 12 min    5. Open book thoracic rotation extension, x 15, NT    7. Rows with green resistance band, x 20    8. ALT shoulder extension with green resistance band, x 20    9. QuadrPED ALT ARM LIFT, ALT LEG LIFT, 3 X 10    11. BRIDGE, 2 x 10; 2 x 30 sec hold    Therapeutic Treatments and Modalities:     1. Neuromuscular Re-education (CPT 57590), see below    2. Gait Training (CPT 80033), 2 x 150 feet, 2 x 100 feet backward gait with focus on trunk rotation and right shoulder extension/swing, decreased step length with backwards gait    3. Manual Therapy (CPT 97850),  0 min    Therapeutic Treatment and Modalities Summary:   1.  Floor to ceiling 8 reps  2.  Side to Side (seated) 8 reps each side  3. Forward step and reach (standing) 8 reps each side  4.  Sideways Step and Reach (standing) 8 reps each side  5.  Backwards Step and Reach (standing) 10 reps each side  6.  Forward Rock and Reach (standing)  10 reps each side  7.  Sideways Rock and Reach(standing) 10 Reps each side        Time-based treatments/modalities:         ASSESSMENT:   Response to treatment: improved internal cueing of upright posture during gait and LSVT Max daily exercises.  Tolerated new lumbar stab well with no increased back pain.  weakness left glutes compared with right       PLAN/RECOMMENDATIONS:   Plan for treatment: therapy treatment to continue next visit.  Planned interventions for next visit: continue with current treatment, gait training (CPT 61410), neuromuscular re-education (CPT 69606) and therapeutic exercise (CPT 49945).

## 2021-06-15 ENCOUNTER — PHYSICAL THERAPY (OUTPATIENT)
Dept: PHYSICAL THERAPY | Facility: REHABILITATION | Age: 76
End: 2021-06-15
Attending: PSYCHIATRY & NEUROLOGY
Payer: MEDICARE

## 2021-06-15 DIAGNOSIS — G20.A1 PARKINSON'S DISEASE (HCC): ICD-10-CM

## 2021-06-15 PROCEDURE — 97110 THERAPEUTIC EXERCISES: CPT

## 2021-06-15 PROCEDURE — 97116 GAIT TRAINING THERAPY: CPT

## 2021-06-15 PROCEDURE — 97112 NEUROMUSCULAR REEDUCATION: CPT

## 2021-06-15 NOTE — OP THERAPY DAILY TREATMENT
Outpatient Physical Therapy  DAILY TREATMENT     [facility] Outpatient Physical Therapy [place] [address] [address] [facility], [address]  [place] [address]  Phone:  [phone]    Date: 06/15/2021    Patient: Ronak Sampson  YOB: 1945  MRN: [id number]     Time Calculation    Start time: 1030  Stop time: 1130 Time Calculation (min): 60 minutes         Chief Complaint: No chief complaint on file.    Visit #: 7    SUBJECTIVE:  Difficulty reaching and pulling seat belt across lap to buckle; chronic T10 localized back pain     OBJECTIVE:  Current objective measures: palpable, painful  nodule T10,           Therapeutic Exercises (CPT 75322):     1. Thread the needle with roller, x 15 b    2. Wall angels, x 20    3. Alt shoulder flexion, horizontal abduction, x 20    4. Nu step L4 10 min, 12 min    5. Open book thoracic rotation extension, x 15, NT    6. Cat camel, x 10    7. Rows with green resistance band, x 20    8. ALT shoulder extension with green resistance band, x 20    9. QuadrPED ALT ARM LIFT, ALT LEG LIFT, 3 X 10    10. Quadraped rocking with hip hinge and neutral lumbar spine, x 20    11. BRIDGE, 2 x 10; 2 x 30 sec hold    Therapeutic Treatments and Modalities:     1. Neuromuscular Re-education (CPT 36835), see below    2. Gait Training (CPT 71928), 2 x 150 feet, 2 x 100 feet backward gait with focus on trunk rotation and right shoulder extension/swing, decreased step length with backwards gait    3. Manual Therapy (CPT 67477),  0 min    Therapeutic Treatment and Modalities Summary:   1.  Floor to ceiling 8 reps  2.  Side to Side (seated) 8 reps each side  3. Forward step and reach (standing) 8 reps each side  4.  Sideways Step and Reach (standing) 8 reps each side  5.  Backwards Step and Reach (standing) 10 reps each side  6.  Forward Rock and Reach (standing)  10 reps each side  7.  Sideways Rock and Reach(standing) 10 Reps each side    Functional Components:  Chop /lift with ball to simulate reaching and putting  seatbelt on in car x 20 Bilateral         Time-based treatments/modalities:    Physical Therapy Timed Treatment Charges  Gait training minutes (CPT 67676): 10 minutes  Neuromusc re-ed, balance, coor, post minutes (CPT 90998): 20 minutes  Therapeutic exercise minutes (CPT 98493): 25 minutes  ASSESSMENT:   Response to treatment: Patient is demonstrating improved internal cueing and  amplitude with gait and functional movement with decreased episodes of freezing.  Continues to require cueing to maintain posture with gait and there ex but is improving.  Back pain remains unchanged but tolerable. Continue with current POC next session.     PLAN/RECOMMENDATIONS:   Plan for treatment: therapy treatment to continue next visit.  Planned interventions for next visit: continue with current treatment, gait training (CPT 55314), neuromuscular re-education (CPT 20098) and therapeutic exercise (CPT 07406).

## 2021-06-18 ENCOUNTER — OFFICE VISIT (OUTPATIENT)
Dept: NEUROLOGY | Facility: MEDICAL CENTER | Age: 76
End: 2021-06-18
Attending: PSYCHIATRY & NEUROLOGY
Payer: MEDICARE

## 2021-06-18 VITALS
DIASTOLIC BLOOD PRESSURE: 74 MMHG | HEIGHT: 68 IN | TEMPERATURE: 98.5 F | WEIGHT: 185.63 LBS | RESPIRATION RATE: 14 BRPM | BODY MASS INDEX: 28.13 KG/M2 | HEART RATE: 79 BPM | OXYGEN SATURATION: 96 % | SYSTOLIC BLOOD PRESSURE: 122 MMHG

## 2021-06-18 DIAGNOSIS — G90.3 ORTHOSTATIC HYPOTENSION DUE TO PARKINSON'S DISEASE (HCC): ICD-10-CM

## 2021-06-18 DIAGNOSIS — G20.A1 PARKINSON'S DISEASE (HCC): ICD-10-CM

## 2021-06-18 PROCEDURE — 99212 OFFICE O/P EST SF 10 MIN: CPT | Performed by: PSYCHIATRY & NEUROLOGY

## 2021-06-18 PROCEDURE — 99213 OFFICE O/P EST LOW 20 MIN: CPT | Performed by: PSYCHIATRY & NEUROLOGY

## 2021-06-18 ASSESSMENT — PATIENT HEALTH QUESTIONNAIRE - PHQ9: CLINICAL INTERPRETATION OF PHQ2 SCORE: 0

## 2021-06-18 NOTE — PROGRESS NOTES
Chief Complaint   Patient presents with   • Follow-Up     Parkinsons Disease       History of present illness:  Ronak Sampson 75 y.o. male with right handed male with right arm tremor and poor coordination first noted after cervical spine ablation therapy. I diagnosed him with PD on 3/19/21 and started sinemet. I also recommended he contact his PCP to switch tamsulosin to terazosin.     PD Meds:  Carb/levo 25/100 1 tab 3x/day at 8pm/2pm/8pm. No wearing off.     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    Sleep: Is up half the night per wife. Does not use anything.    Constipation: No problems.   Urination: On tamsulosin.    Dizziness: No   Hallucinations: No   Cognition: No   Balance: No recent falls.  Pain: He has pain in the low mid right back. This is constantly present and aching.     6/18/21: He has started carbidopa/levodopa and is not as tremulous. He has not noticed a change in his movement speed. He has been on sinemet now for 3 months. He does not have side effects to C/L. He switched to terazosin however his blood pressure markedly decreased with standing on this. He is doing physical therapy currently with Mari Smiley.      Past medical history:   No past medical history on file.    Past surgical history:   Past Surgical History:   Procedure Laterality Date   • COLONOSCOPY - ENDO  2/28/2016    Procedure: COLONOSCOPY - ENDO;  Surgeon: Lee Powers M.D.;  Location: [facility];  Service:    • GASTROSCOPY WITH CLIPPING  2/26/2016    Procedure: GASTROSCOPY WITH CLIPPING;  Surgeon: Lee Powers M.D.;  Location: [facility];  Service:    • GASTROSCOPY-ENDO N/A 2/25/2016    Procedure: GASTROSCOPY-ENDO;  Surgeon: Piyush Cole M.D.;  Location: [facility];  Service:        Family history:   No family history on file.    Social history:   Social History     Socioeconomic History   • Marital status:      Spouse  name: Not on file   • Number of children: Not on file   • Years of education: Not on file   • Highest education level: Not on file   Occupational History   • Not on file   Tobacco Use   • Smoking status: Never Smoker   • Smokeless tobacco: Never Used   Vaping Use   • Vaping Use: Never used   Substance and Sexual Activity   • Alcohol use: Not Currently   • Drug use: Not on file   • Sexual activity: Not on file   Other Topics Concern   • Not on file   Social History Narrative   • Not on file     Social Determinants of Health     Financial Resource Strain:    • Difficulty of Paying Living Expenses:    Food Insecurity:    • Worried About Running Out of Food in the Last Year:    • Ran Out of Food in the Last Year:    Transportation Needs:    • Lack of Transportation (Medical):    • Lack of Transportation (Non-Medical):    Physical Activity:    • Days of Exercise per Week:    • Minutes of Exercise per Session:    Stress:    • Feeling of Stress :    Social Connections:    • Frequency of Communication with Friends and Family:    • Frequency of Social Gatherings with Friends and Family:    • Attends [employer] Services:    • Active Member of Clubs or Organizations:    • Attends Club or Organization Meetings:    • Marital Status:    Intimate Partner Violence:    • Fear of Current or Ex-Partner:    • Emotionally Abused:    • Physically Abused:    • Sexually Abused:        Current medications:   Current Outpatient Medications   Medication   • carbidopa-levodopa (SINEMET)  MG Tab   • omeprazole (PRILOSEC) 40 MG delayed-release capsule   • tamsulosin (FLOMAX) 0.4 MG capsule   • tramadol (ULTRAM) 50 MG Tab   • acetaminophen (TYLENOL) 500 MG Tab   • hydroCHLOROthiazide (HYDRODIURIL) 25 MG Tab     No current facility-administered medications for this visit.       Medication Allergy:  No Known Allergies    Physical examination:   Vitals:    06/18/21 1109 06/18/21 1113   BP: 140/80 122/74   BP Location: Left arm    Patient  "Position: Sitting    BP Cuff Size: Adult    Pulse: 75 79   Resp: 14    Temp: 36.9 °C (98.5 °F)    TempSrc: Temporal    SpO2: 95% 96%   Weight: 84.2 kg (185 lb 10 oz)    Height: 1.737 m (5' 8.4\")      Neurological Exam  Mental Status  Awake and alert. Speech is normal. Language is fluent with no aphasia.    Motor   Increased muscle tone. Right arm rigidity. . The following abnormal movements were seen: Mild right arm rest tremor.  .  R diminished foot tapping amplitude. .    Gait  Casual gait: Reduced stride length. Reduced right arm swing. Normal left arm swing.  Mild freezing with turns. .       Labs:  None    Imaging:   None     ASSESSMENT AND PLAN:  Problem List Items Addressed This Visit     Parkinson's disease (HCC)    Relevant Medications    carbidopa-levodopa (SINEMET)  MG Tab    Orthostatic hypotension due to Parkinson's disease (HCC)    Relevant Medications    carbidopa-levodopa (SINEMET)  MG Tab          1. Parkinson's disease (HCC)  - carbidopa-levodopa (SINEMET)  MG Tab; Take 1 tablet by mouth 3 times a day.  Dispense: 270 tablet; Refill: 2    2. Orthostatic hypotension due to Parkinson's disease (HCC)  Doing better on sinemet. His tremor and movement speed and improved on 1 tab TID.   He has occasional dizziness likely attributable to orthostatic hypotension that sinemet slightly worsens however his standing BP is not low today. He has been counseled to maintain adequate hydration.     FOLLOW-UP:   Return in about 3 months (around 9/18/2021).    Total time spent for the day for this patient unrelated to procedure time is: 20 minutes. I spent 13 minutes in face to face time and I spent 3 minutes pre-charting and 4 minutes in post-visit documentation.      Dr. Inderjit Jimenez D.O.  [facility] Neurology   Movement Disorders Specialist     "

## 2021-06-22 ENCOUNTER — APPOINTMENT (OUTPATIENT)
Dept: PHYSICAL THERAPY | Facility: REHABILITATION | Age: 76
End: 2021-06-22
Attending: PSYCHIATRY & NEUROLOGY
Payer: MEDICARE

## 2021-06-29 ENCOUNTER — PHYSICAL THERAPY (OUTPATIENT)
Dept: PHYSICAL THERAPY | Facility: REHABILITATION | Age: 76
End: 2021-06-29
Attending: PSYCHIATRY & NEUROLOGY
Payer: MEDICARE

## 2021-06-29 DIAGNOSIS — G20.A1 PARKINSON'S DISEASE (HCC): ICD-10-CM

## 2021-06-29 PROCEDURE — 97112 NEUROMUSCULAR REEDUCATION: CPT

## 2021-06-29 PROCEDURE — 97110 THERAPEUTIC EXERCISES: CPT

## 2021-06-29 PROCEDURE — 97116 GAIT TRAINING THERAPY: CPT

## 2021-06-29 NOTE — OP THERAPY DAILY TREATMENT
Outpatient Physical Therapy  DAILY TREATMENT     [facility] Outpatient Physical Therapy [place] [address] [address] [facility], [address]  [place] [address]  Phone:  [phone]    Date: 06/29/2021    Patient: Ronak Sampson  YOB: 1945  MRN: [id number]     Time Calculation    Start time: 1030  Stop time: 1130 Time Calculation (min): 60 minutes         Chief Complaint: t/s pain, parkinson's  Visit #: 8    SUBJECTIVE:  My mid back is a little sore today. I'm doing ok with the HEP.     OBJECTIVE:        Therapeutic Exercises (CPT 81433):     1. Thread the needle with roller, x 15 b    2. Wall angels, x 20    3. Alt shoulder flexion, horizontal abduction, x 20    4. Nu step L4 10 min, 12 min    5. Open book thoracic rotation extension, x 15, NT    6. Cat camel, x 10    7. Rows with green resistance band, horizontal abduction , x 20    8. ALT shoulder extension with green resistance band, x 20    9. QuadrPED ALT ARM LIFT, ALT LEG LIFT, 3 X 10    10. Quadraped rocking with hip hinge and neutral lumbar spine, x 20    11. BRIDGE, 2 x 10; 2 x 30 sec hold    Therapeutic Treatments and Modalities:     1. Neuromuscular Re-education (CPT 89394), see below    2. Gait Training (CPT 22771), 2 x 150 feet, 2 x 100 feet backward gait with focus on trunk rotation and right shoulder extension/swing, decreased step length with backwards gait    Therapeutic Treatment and Modalities Summary:   1.  Floor to ceiling 8 reps  2.  Side to Side (seated) 8 reps each side  3. Forward step and reach (standing) 8 reps each side  4.  Sideways Step and Reach (standing) 8 reps each side  5.  Backwards Step and Reach (standing) 10 reps each side  6.  Forward Rock and Reach (standing)  10 reps each side  7.  Sideways Rock and Reach(standing) 10 Reps each side            Time-based treatments/modalities:    Physical Therapy Timed Treatment Charges  Gait training minutes (CPT 41948): 15 minutes  Neuromusc re-ed, balance, coor, post minutes (CPT 19177): 20  minutes  Therapeutic exercise minutes (CPT 68161): 20 minutes          ASSESSMENT:   Pt has limited trunk rotation or UE swing with gait. Pt encouraged to practice exaggerated trunk rotation at home.     PLAN/RECOMMENDATIONS:   Plan for treatment: therapy treatment to continue next visit.  Planned interventions for next visit: continue with current treatment, functional training/self care (CPT 00728), gait training (CPT 96722), neuromuscular re-education (CPT 34344) and therapeutic exercise (CPT 17623).

## 2021-07-06 ENCOUNTER — PHYSICAL THERAPY (OUTPATIENT)
Dept: PHYSICAL THERAPY | Facility: REHABILITATION | Age: 76
End: 2021-07-06
Attending: PSYCHIATRY & NEUROLOGY
Payer: MEDICARE

## 2021-07-06 DIAGNOSIS — G20.A1 PARKINSON'S DISEASE (HCC): ICD-10-CM

## 2021-07-06 PROCEDURE — 97110 THERAPEUTIC EXERCISES: CPT

## 2021-07-06 PROCEDURE — 97112 NEUROMUSCULAR REEDUCATION: CPT

## 2021-07-13 ENCOUNTER — APPOINTMENT (OUTPATIENT)
Dept: PHYSICAL THERAPY | Facility: REHABILITATION | Age: 76
End: 2021-07-13
Attending: PSYCHIATRY & NEUROLOGY
Payer: MEDICARE

## 2021-07-20 ENCOUNTER — APPOINTMENT (OUTPATIENT)
Dept: PHYSICAL THERAPY | Facility: REHABILITATION | Age: 76
End: 2021-07-20
Attending: PSYCHIATRY & NEUROLOGY
Payer: MEDICARE

## 2021-07-27 ENCOUNTER — APPOINTMENT (OUTPATIENT)
Dept: PHYSICAL THERAPY | Facility: REHABILITATION | Age: 76
End: 2021-07-27
Attending: PSYCHIATRY & NEUROLOGY
Payer: MEDICARE

## 2021-09-22 ENCOUNTER — OFFICE VISIT (OUTPATIENT)
Dept: NEUROLOGY | Facility: MEDICAL CENTER | Age: 76
End: 2021-09-22
Attending: PSYCHIATRY & NEUROLOGY
Payer: MEDICARE

## 2021-09-22 VITALS
DIASTOLIC BLOOD PRESSURE: 92 MMHG | TEMPERATURE: 98.8 F | HEIGHT: 67 IN | OXYGEN SATURATION: 94 % | HEART RATE: 72 BPM | SYSTOLIC BLOOD PRESSURE: 142 MMHG | BODY MASS INDEX: 28.79 KG/M2 | WEIGHT: 183.42 LBS

## 2021-09-22 DIAGNOSIS — G20.A1 PARKINSON'S DISEASE (HCC): ICD-10-CM

## 2021-09-22 PROCEDURE — 99212 OFFICE O/P EST SF 10 MIN: CPT | Performed by: PSYCHIATRY & NEUROLOGY

## 2021-09-22 PROCEDURE — 99211 OFF/OP EST MAY X REQ PHY/QHP: CPT | Performed by: PSYCHIATRY & NEUROLOGY

## 2021-09-22 NOTE — PROGRESS NOTES
Chief Complaint   Patient presents with   • Follow-Up     Parkinson's       History of present illness:  Ronak Sampson 76 y.o. male with Parkinson's disease for the last 3-4 years presenting with R arm tremor.      PD Meds:  Carb/levo 25/100 1 tab 3x/day at 8pm/2pm/8pm. No wearing off.      Movement-Specific ROS  Anosmia: Has had chronic loss of smell/taste.    Sleep: He sleeps 6-7 hours per night and naps 1 hour daily.   Speech: No problems   Swallowing: No problems   Constipation: No problems.   Urination: On tamsulosin.    Dizziness: No   Hallucinations: No   Cognition: No   Balance: No recent falls.  Pain: He has pain in the low mid right back. This is constantly present and aching.      6/18/21: He has started carbidopa/levodopa and is not as tremulous. He has not noticed a change in his movement speed. He has been on sinemet now for 3 months. He does not have side effects to C/L. He switched to terazosin however his blood pressure markedly decreased with standing on this. He is doing physical therapy currently with Mari Smiley.      9/22/21: His tremors are well treated on his current dose of carb/levo. His exercise consists mainly of walking and working in the yard.     Past medical history:   No past medical history on file.    Past surgical history:   Past Surgical History:   Procedure Laterality Date   • COLONOSCOPY - ENDO  2/28/2016    Procedure: COLONOSCOPY - ENDO;  Surgeon: Lee Powers M.D.;  Location: [facility];  Service:    • GASTROSCOPY WITH CLIPPING  2/26/2016    Procedure: GASTROSCOPY WITH CLIPPING;  Surgeon: Lee Powers M.D.;  [facility] [facility]  Service:    • GASTROSCOPY-ENDO N/A 2/25/2016    Procedure: GASTROSCOPY-ENDO;  Surgeon: Piyush Cole M.D.;  [facility] [facility]  Service:        Family history:   No family history on file.    Social history:   Social History     Socioeconomic History   • Marital status:       Spouse name: Not on file   • Number of children: Not on file   • Years of education: Not on file   • Highest education level: Not on file   Occupational History   • Not on file   Tobacco Use   • Smoking status: Never Smoker   • Smokeless tobacco: Never Used   Vaping Use   • Vaping Use: Never used   Substance and Sexual Activity   • Alcohol use: Not Currently   • Drug use: Not on file   • Sexual activity: Not on file   Other Topics Concern   • Not on file   Social History Narrative   • Not on file     Social Determinants of Health     Financial Resource Strain:    • Difficulty of Paying Living Expenses:    Food Insecurity:    • Worried About Running Out of Food in the Last Year:    • Ran Out of Food in the Last Year:    Transportation Needs:    • Lack of Transportation (Medical):    • Lack of Transportation (Non-Medical):    Physical Activity:    • Days of Exercise per Week:    • Minutes of Exercise per Session:    Stress:    • Feeling of Stress :    Social Connections:    • Frequency of Communication with Friends and Family:    • Frequency of Social Gatherings with Friends and Family:    • Attends [employer] Services:    • Active Member of Clubs or Organizations:    • Attends Club or Organization Meetings:    • Marital Status:    Intimate Partner Violence:    • Fear of Current or Ex-Partner:    • Emotionally Abused:    • Physically Abused:    • Sexually Abused:        Current medications:   Current Outpatient Medications   Medication   • carbidopa-levodopa (SINEMET)  MG Tab   • omeprazole (PRILOSEC) 40 MG delayed-release capsule   • tramadol (ULTRAM) 50 MG Tab   • acetaminophen (TYLENOL) 500 MG Tab   • hydroCHLOROthiazide (HYDRODIURIL) 25 MG Tab   • tamsulosin (FLOMAX) 0.4 MG capsule     No current facility-administered medications for this visit.       Medication Allergy:  No Known Allergies    Physical examination:   Vitals:    09/22/21 0953 09/22/21 0957   BP: (!) 164/88 142/92   BP Location: Left arm  "Left arm   Patient Position: Sitting Standing   BP Cuff Size: Adult Adult   Pulse: 63 72   Temp: 37.1 °C (98.8 °F)    TempSrc: Temporal    SpO2: 95% 94%   Weight: 83.2 kg (183 lb 6.8 oz)    Height: 1.702 m (5' 7\")      Neurological Exam  Mental Status  Awake and alert. Speech is normal. Language is fluent with no aphasia.    Motor   Increased muscle tone. +Neck rigidity and bilateral upper extremity rigidity . The following abnormal movements were seen: Mild bilateral upper extremity rest tremor L>R.  Normal speed and amplitude of rapid finger and foot tapping.   .    Gait  Casual gait: Shuffling gait. Reduced right arm swing. Reduced left arm swing. Normal pull test.  Mild bradykinesia   .       Labs:  I reviewed the following labs personally:  None    Imaging:   None    ASSESSMENT AND PLAN:  Problem List Items Addressed This Visit     Parkinson's disease (HCC)          1. Parkinson's disease (HCC)    He is doing well on his current dose of C/L. No changes were made today.     FOLLOW-UP:   Return in about 6 months (around 3/22/2022).    Total time spent for the day for this patient unrelated to procedure time is: 15 minutes. I spent 11 minutes in face to face time and I spent 2 minutes pre-charting and 2 minutes in post-visit documentation.      ROSIE OatesO.  [facility] Neurology   Movement Disorders Specialist     "

## 2022-03-22 ENCOUNTER — OFFICE VISIT (OUTPATIENT)
Dept: NEUROLOGY | Facility: MEDICAL CENTER | Age: 77
End: 2022-03-22
Attending: PSYCHIATRY & NEUROLOGY
Payer: MEDICARE

## 2022-03-22 VITALS
BODY MASS INDEX: 29.34 KG/M2 | OXYGEN SATURATION: 95 % | TEMPERATURE: 98.1 F | RESPIRATION RATE: 14 BRPM | HEART RATE: 68 BPM | SYSTOLIC BLOOD PRESSURE: 124 MMHG | WEIGHT: 186.95 LBS | HEIGHT: 67 IN | DIASTOLIC BLOOD PRESSURE: 70 MMHG

## 2022-03-22 DIAGNOSIS — G20.A1 PARKINSON'S DISEASE (HCC): ICD-10-CM

## 2022-03-22 PROCEDURE — 99213 OFFICE O/P EST LOW 20 MIN: CPT | Performed by: PSYCHIATRY & NEUROLOGY

## 2022-03-22 ASSESSMENT — PATIENT HEALTH QUESTIONNAIRE - PHQ9: CLINICAL INTERPRETATION OF PHQ2 SCORE: 0

## 2022-03-22 NOTE — PROGRESS NOTES
Chief Complaint   Patient presents with   • Follow-Up     Parkinson's disease       History of present illness:  Ronak Sampson 76 y.o. male with Parkinson's disease for the last 3-4 years presenting with R arm tremor.       PD Meds:  Carb/levo 25/100 1 tab 3x/day at 8pm/2pm/8pm. No wearing off. This has been effective at treating his tremor.      Movement-Specific ROS  Anosmia: Has had chronic loss of smell/taste.    Sleep: He sleeps 6-7 hours per night and naps 1 hour daily.   Speech: No problems   Swallowing: No problems   Constipation: No problems.   Urination: On tamsulosin.    Dizziness: No   Hallucinations: No   Cognition: No   Balance: No recent falls.  Pain: He has pain in the low mid right back. This is constantly present and aching.   Depression: None   Anxiety: None      6/18/21: He has started carbidopa/levodopa and is not as tremulous. He has not noticed a change in his movement speed. He has been on sinemet now for 3 months. He does not have side effects to C/L. He switched to terazosin however his blood pressure markedly decreased with standing on this. He is doing physical therapy currently with Mari Smiley.       9/22/21: His tremors are well treated on his current dose of carb/levo. His exercise consists mainly of walking and working in the yard.      3/22/22: He has not noticed a significant change in his motor function or tremors. He has slowed down significantly over the last few years. His right arm feels weaker.      Past medical history:   No past medical history on file.    Past surgical history:   Past Surgical History:   Procedure Laterality Date   • COLONOSCOPY - ENDO  2/28/2016    Procedure: COLONOSCOPY - ENDO;  Surgeon: Lee Powers M.D.;  Location: ENDOSCOPY [facility];  Service:    • GASTROSCOPY WITH CLIPPING  2/26/2016    Procedure: GASTROSCOPY WITH CLIPPING;  Surgeon: Lee Powers M.D.;  Location: ENDOSCOPY [facility];  Service:    • GASTROSCOPY-ENDO  "N/A 2/25/2016    Procedure: GASTROSCOPY-ENDO;  Surgeon: iPyush Cole M.D.;  Location: ENDOSCOPY [facility];  Service:        Family history:   No family history on file.    Social history:   Social History     Socioeconomic History   • Marital status:      Spouse name: Not on file   • Number of children: Not on file   • Years of education: Not on file   • Highest education level: Not on file   Occupational History   • Not on file   Tobacco Use   • Smoking status: Never Smoker   • Smokeless tobacco: Never Used   Vaping Use   • Vaping Use: Never used   Substance and Sexual Activity   • Alcohol use: Not Currently   • Drug use: Not on file   • Sexual activity: Not on file   Other Topics Concern   • Not on file   Social History Narrative   • Not on file     Social Determinants of Health     Financial Resource Strain: Not on file   Food Insecurity: Not on file   Transportation Needs: Not on file   Physical Activity: Not on file   Stress: Not on file   Social Connections: Not on file   Intimate Partner Violence: Not on file   Housing Stability: Not on file       Current medications:   Current Outpatient Medications   Medication   • carbidopa-levodopa (SINEMET)  MG Tab   • omeprazole (PRILOSEC) 40 MG delayed-release capsule   • tramadol (ULTRAM) 50 MG Tab   • acetaminophen (TYLENOL) 500 MG Tab     No current facility-administered medications for this visit.       Medication Allergy:  No Known Allergies    Physical examination:   Vitals:    03/22/22 1126 03/22/22 1129   BP: 140/82 124/70   BP Location: Left arm Left arm   Patient Position: Sitting Standing   BP Cuff Size: Adult Adult   Pulse: 61 68   Resp: 14    Temp: 36.7 °C (98.1 °F)    TempSrc: Temporal    SpO2: 96% 95%   Weight: 84.8 kg (186 lb 15.2 oz)    Height: 1.702 m (5' 7\")      Neurological Exam    Motor   Increased muscle tone. He has right arm rigidity.. The following abnormal movements were seen: Is a mild resting tremor of the right arm " that is intermittent..    Normal speed and amplitude of repetitive arm movements.  His right leg tapping is slower than the left..    Gait    Slightly diminished right arm swing, however he is not markedly bradykinetic..       Labs:  I reviewed the following labs personally:  None    Imaging:   None    ASSESSMENT AND PLAN:  Problem List Items Addressed This Visit     Parkinson's disease ([facility])          1. Parkinson's disease ([facility])  76-year-old male with Parkinson's disease, doing well currently on carbidopa/levodopa 1 tablet 3 times daily.  His tremor and bradykinesia is mild.  His symptoms are mainly right-sided.  No changes were made to his medications today. I counseled him on moderate to high intensity exercise.    FOLLOW-UP:   Return in about 6 months (around 9/22/2022).    Total time spent for the day for this patient unrelated to procedure time is: 13 minutes. I spent 9 minutes in face to face time and I spent 1 minutes pre-charting and 3 minutes in post-visit documentation.      ROSIE OatesO.  [facility]

## 2022-07-05 ENCOUNTER — TELEPHONE (OUTPATIENT)
Dept: PHYSICAL THERAPY | Facility: REHABILITATION | Age: 77
End: 2022-07-05
Payer: MEDICARE

## 2022-07-05 NOTE — OP THERAPY DISCHARGE SUMMARY
Outpatient Physical Therapy  DISCHARGE SUMMARY NOTE      [facility] [address], [address]  [place] [address]  Phone:  [phone]    Date of Visit: 07/05/2022    Patient: Ronak Sampson  YOB: 1945  MRN: [id number]     Referring Provider:  Parkinson's disease (HCC     Referring Diagnosis  Americo Jimenez D.O.           Functional Assessment Used        Your patient is being discharged from Physical Therapy with the following comments:   · Goals partially met  · Patient has failed to schedule or reschedule follow-up visits    Comments:  Please see daily treatment notes for details     Limitations Remaining:      Recommendations:  Discharge from PT    Mari Smiley PT, MSPT    Date: 7/5/2022

## 2022-09-20 ENCOUNTER — OFFICE VISIT (OUTPATIENT)
Dept: NEUROLOGY | Facility: MEDICAL CENTER | Age: 77
End: 2022-09-20
Attending: PSYCHIATRY & NEUROLOGY
Payer: MEDICARE

## 2022-09-20 VITALS
SYSTOLIC BLOOD PRESSURE: 118 MMHG | OXYGEN SATURATION: 95 % | HEIGHT: 67 IN | TEMPERATURE: 98.2 F | WEIGHT: 186.07 LBS | BODY MASS INDEX: 29.2 KG/M2 | HEART RATE: 68 BPM | DIASTOLIC BLOOD PRESSURE: 80 MMHG | RESPIRATION RATE: 16 BRPM

## 2022-09-20 DIAGNOSIS — G20.A1 PARKINSON DISEASE (HCC): ICD-10-CM

## 2022-09-20 PROCEDURE — 99211 OFF/OP EST MAY X REQ PHY/QHP: CPT | Performed by: PSYCHIATRY & NEUROLOGY

## 2022-09-20 PROCEDURE — 99213 OFFICE O/P EST LOW 20 MIN: CPT | Performed by: PSYCHIATRY & NEUROLOGY

## 2022-09-20 RX ORDER — OMEPRAZOLE 20 MG/1
CAPSULE, DELAYED RELEASE ORAL
COMMUNITY
Start: 2022-07-10 | End: 2022-09-20

## 2022-09-20 NOTE — PROGRESS NOTES
Chief Complaint   Patient presents with    Follow-Up     Parkinson's disease       History of present illness:  Ronak Sampson 77 y.o. male with Parkinson's disease for the last 3-4 years presenting with R arm tremor.       PD Meds:  Carb/levo 25/100 1 tab 3x/day at 8pm/2pm/8pm. No wearing off. This has been effective at treating his tremor.      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    Sleep: He sleeps 6-7 hours per night and naps 1 hour daily.   Speech: No problems   Swallowing: No problems   Constipation: No problems.   Urination: On tamsulosin.    Dizziness: No   Hallucinations: No   Cognition: No   Balance: No recent falls.  Pain: He has pain in the low mid right back. This is constantly present and aching.   Depression: None   Anxiety: None      6/18/21: He has started carbidopa/levodopa and is not as tremulous. He has not noticed a change in his movement speed. He has been on sinemet now for 3 months. He does not have side effects to C/L. He switched to terazosin however his blood pressure markedly decreased with standing on this. He is doing physical therapy currently with Mari Smiley.       9/22/21: His tremors are well treated on his current dose of carb/levo. His exercise consists mainly of walking and working in the yard.       3/22/22: He has not noticed a significant change in his motor function or tremors. He has slowed down significantly over the last few years. His right arm feels weaker.       9/20/22: He reports that his Parkinson's is the same. Tremor is mild, and is worse when he gets nervous. His right arm is weaker than it used to be. He is lifting weights.     Past medical history:   No past medical history on file.    Past surgical history:   Past Surgical History:   Procedure Laterality Date    COLONOSCOPY - ENDO  2/28/2016    Procedure: COLONOSCOPY - ENDO;  Surgeon: Lee Powers M.D.;  Location: ENDOSCOPY [facility];  Service:     GASTROSCOPY  "WITH CLIPPING  2/26/2016    Procedure: GASTROSCOPY WITH CLIPPING;  Surgeon: Lee Powers M.D.;  Location: ENDOSCOPY [facility];  Service:     GASTROSCOPY-ENDO N/A 2/25/2016    Procedure: GASTROSCOPY-ENDO;  Surgeon: Piyush Cole M.D.;  Location: ENDOSCOPY [facility];  Service:        Family history:   No family history on file.    Social history:   Social History     Socioeconomic History    Marital status:      Spouse name: Not on file    Number of children: Not on file    Years of education: Not on file    Highest education level: Not on file   Occupational History    Not on file   Tobacco Use    Smoking status: Never    Smokeless tobacco: Never   Vaping Use    Vaping Use: Never used   Substance and Sexual Activity    Alcohol use: Not Currently    Drug use: Never    Sexual activity: Not on file   Other Topics Concern    Not on file   Social History Narrative    Not on file     Social Determinants of Health     Financial Resource Strain: Not on file   Food Insecurity: Not on file   Transportation Needs: Not on file   Physical Activity: Not on file   Stress: Not on file   Social Connections: Not on file   Intimate Partner Violence: Not on file   Housing Stability: Not on file       Current medications:   Current Outpatient Medications   Medication    carbidopa-levodopa (SINEMET)  MG Tab    omeprazole (PRILOSEC) 40 MG delayed-release capsule    tramadol (ULTRAM) 50 MG Tab    acetaminophen (TYLENOL) 500 MG Tab     No current facility-administered medications for this visit.       Medication Allergy:  No Known Allergies    Physical examination:   Vitals:    09/20/22 0953 09/20/22 0956   BP: 122/82 118/80   BP Location: Left arm Left arm   Patient Position: Sitting Standing   BP Cuff Size: Adult Adult   Pulse: 65 68   Resp: 16    Temp: 36.8 °C (98.2 °F)    TempSrc: Temporal    SpO2: 97% 95%   Weight: 84.4 kg (186 lb 1.1 oz)    Height: 1.702 m (5' 7\")      Neurological Exam     Motor   " Increased muscle tone. He has right arm rigidity.. The following abnormal movements were seen: Is a mild resting tremor of the right arm that is intermittent..     Normal speed and amplitude of repetitive arm movements.  His right leg tapping is slower than the left..     Gait     Slightly diminished right arm swing, however he is not markedly bradykinetic.    Labs:  I reviewed the following labs personally:  None    Imaging:   None     ASSESSMENT AND PLAN:  Problem List Items Addressed This Visit    None  Visit Diagnoses       Parkinson disease (HCC)                1. Parkinson disease (HCC)    He has rigidity of the right arm but is satisfied with his motor function. He wants to stay on his current dose of carb/levo. No changes were made to his medications today.     FOLLOW-UP:   Return in about 6 months (around 3/20/2023).    Total time spent for the day for this patient unrelated to procedure time is: 15 minutes. I spent 11 minutes in face to face time and I spent 1 minutes pre-charting and 3 minutes in post-visit documentation.      ROSIE OatesO.  [facility]

## 2023-02-21 DIAGNOSIS — G20.A1 PARKINSON'S DISEASE (HCC): ICD-10-CM

## 2023-03-20 ENCOUNTER — TELEPHONE (OUTPATIENT)
Dept: NEUROLOGY | Facility: MEDICAL CENTER | Age: 78
End: 2023-03-20
Payer: MEDICARE

## 2023-03-20 NOTE — TELEPHONE ENCOUNTER

## 2023-03-21 ENCOUNTER — OFFICE VISIT (OUTPATIENT)
Dept: NEUROLOGY | Facility: MEDICAL CENTER | Age: 78
End: 2023-03-21
Attending: PSYCHIATRY & NEUROLOGY
Payer: MEDICARE

## 2023-03-21 VITALS
DIASTOLIC BLOOD PRESSURE: 94 MMHG | HEIGHT: 67 IN | WEIGHT: 189.6 LBS | TEMPERATURE: 97.9 F | RESPIRATION RATE: 18 BRPM | SYSTOLIC BLOOD PRESSURE: 132 MMHG | OXYGEN SATURATION: 96 % | HEART RATE: 90 BPM | BODY MASS INDEX: 29.76 KG/M2

## 2023-03-21 DIAGNOSIS — G20.A1 PARKINSON'S DISEASE (HCC): ICD-10-CM

## 2023-03-21 PROCEDURE — 99211 OFF/OP EST MAY X REQ PHY/QHP: CPT | Performed by: PSYCHIATRY & NEUROLOGY

## 2023-03-21 PROCEDURE — 99213 OFFICE O/P EST LOW 20 MIN: CPT | Performed by: PSYCHIATRY & NEUROLOGY

## 2023-03-21 RX ORDER — OMEPRAZOLE 20 MG/1
CAPSULE, DELAYED RELEASE ORAL
COMMUNITY
Start: 2023-02-08

## 2023-03-21 ASSESSMENT — PATIENT HEALTH QUESTIONNAIRE - PHQ9: CLINICAL INTERPRETATION OF PHQ2 SCORE: 0

## 2023-03-21 NOTE — PROGRESS NOTES
Chief Complaint   Patient presents with    Follow-Up     Parkinson disease        History of present illness:  Ronak Sampson 77 y.o. male with Parkinson's disease for the last 3-4 years presenting with R arm tremor.       PD Meds:  Carb/levo 25/100 1 tab 3x/day at 8pm/2pm/8pm. No wearing off. This has been effective at treating his tremor.      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    Sleep: He sleeps 6-7 hours per night and naps 1 hour daily.   Speech: No problems   Swallowing: No problems   Constipation: No problems.   Urination: On tamsulosin.    Dizziness: No   Hallucinations: No   Cognition: No   Balance: No recent falls.  Pain: He has pain in the low mid right back. This is constantly present and aching.   Depression: None   Anxiety: None      6/18/21: He has started carbidopa/levodopa and is not as tremulous. He has not noticed a change in his movement speed. He has been on sinemet now for 3 months. He does not have side effects to C/L. He switched to terazosin however his blood pressure markedly decreased with standing on this. He is doing physical therapy currently with Mari Smiley.       9/22/21: His tremors are well treated on his current dose of carb/levo. His exercise consists mainly of walking and working in the yard.       3/22/22: He has not noticed a significant change in his motor function or tremors. He has slowed down significantly over the last few years. His right arm feels weaker.        9/20/22: He reports that his Parkinson's is the same. Tremor is mild, and is worse when he gets nervous. His right arm is weaker than it used to be. He is lifting weights.     3/21/23: No significant changes in his motor function. His right side is affected and his left side is normal.  His main concern is that he has trouble with the range of motion/mobility of his R arm.     Past medical history:   No past medical history on file.    Past surgical history:   Past Surgical  History:   Procedure Laterality Date    COLONOSCOPY - ENDO  2/28/2016    Procedure: COLONOSCOPY - ENDO;  Surgeon: Lee Powers M.D.;  Location: ENDOSCOPY [facility];  Service:     GASTROSCOPY WITH CLIPPING  2/26/2016    Procedure: GASTROSCOPY WITH CLIPPING;  Surgeon: Lee Powers M.D.;  Location: ENDOSCOPY [facility];  Service:     GASTROSCOPY-ENDO N/A 2/25/2016    Procedure: GASTROSCOPY-ENDO;  Surgeon: Piyush Cole M.D.;  Location: ENDOSCOPY [facility];  Service:        Family history:   No family history on file.    Social history:   Social History     Socioeconomic History    Marital status:      Spouse name: Not on file    Number of children: Not on file    Years of education: Not on file    Highest education level: Not on file   Occupational History    Not on file   Tobacco Use    Smoking status: Never    Smokeless tobacco: Never   Vaping Use    Vaping Use: Never used   Substance and Sexual Activity    Alcohol use: Not Currently    Drug use: Never    Sexual activity: Not on file   Other Topics Concern    Not on file   Social History Narrative    Not on file     Social Determinants of Health     Financial Resource Strain: Not on file   Food Insecurity: Not on file   Transportation Needs: Not on file   Physical Activity: Not on file   Stress: Not on file   Social Connections: Not on file   Intimate Partner Violence: Not on file   Housing Stability: Not on file       Current medications:   Current Outpatient Medications   Medication    omeprazole (PRILOSEC) 20 MG delayed-release capsule    carbidopa-levodopa (SINEMET)  MG Tab    tramadol (ULTRAM) 50 MG Tab    acetaminophen (TYLENOL) 500 MG Tab    omeprazole (PRILOSEC) 40 MG delayed-release capsule     No current facility-administered medications for this visit.       Medication Allergy:  No Known Allergies    Physical examination:   Vitals:    03/21/23 0945 03/21/23 0956   BP: (!) 138/96 (!) 132/94   BP Location: Left  "arm Left arm   Patient Position: Sitting Standing   BP Cuff Size: Adult Adult   Pulse: 72 90   Resp: 18    Temp: 36.6 °C (97.9 °F)    TempSrc: Temporal    SpO2: 96%    Weight: 86 kg (189 lb 9.5 oz)    Height: 1.702 m (5' 7\")      Neurological Exam    Motor   Increased muscle tone. Increased tone in the bilateral upper extremities. The following abnormal movements were seen: Mild right arm rest tremor.    Mildly decreased movement speed with repetitive movements bilaterally R>L.    Gait    Mild bradykinesia - normal arm swing bilaterally and normal stride length .     Labs:  I reviewed the following labs personally:  None    Imaging:   None     ASSESSMENT AND PLAN:  Problem List Items Addressed This Visit       Parkinson's disease (HCC)       1. Parkinson's disease (HCC)    Other orders  - omeprazole (PRILOSEC) 20 MG delayed-release capsule; TAKE 1 CAPSULE BY MOUTH IN THE MORNING ON AN EMPTY STOMACH FOR GERD    77-year-old male with Parkinson's disease, who is doing well.  No changes were made to his Parkinson's disease drug regimen today.  He has predominantly right-sided symptoms, with no worsening of late.  His carbidopa levodopa is effective and does not require dose frequency adjustment.  I have counseled him again on exercise and specifically mobility related exercises for his right side.  He lives in [place] therefore cannot engage in rock steady boxing but he can join a gym there.    FOLLOW-UP:   Return in about 1 year (around 3/21/2024).    Total time spent for the day for this patient unrelated to procedure time is: 20 minutes. I spent 10 minutes in face to face time and I spent 5 minutes pre-charting and 5 minutes in post-visit documentation.      Dr. Inderjit Jimenez D.O.  [facility]    "

## 2023-05-30 DIAGNOSIS — G20.A1 PARKINSON'S DISEASE (HCC): ICD-10-CM

## 2023-05-31 NOTE — TELEPHONE ENCOUNTER
Carbidopa-Levodopa  MG Tabs    Request rec'd via [facility], RTJOSEPH Everett paid copay $15.00 #[id number] DS notifying liaison Vandana Ho. - 05/31/2023 4:38pm

## 2023-05-31 NOTE — TELEPHONE ENCOUNTER
Received request via: Pharmacy    Was the patient seen in the last year in this department? Yes    Does the patient have an active prescription (recently filled or refills available) for medication(s) requested? Yes.   Does the patient have skilled nursing Plus and need 100 day supply (blood pressure, diabetes and cholesterol meds only)? Patient does not have SCP

## 2023-10-09 ENCOUNTER — HOSPITAL ENCOUNTER (OUTPATIENT)
Dept: RADIOLOGY | Facility: MEDICAL CENTER | Age: 78
End: 2023-10-09

## 2023-10-09 ENCOUNTER — HOSPITAL ENCOUNTER (EMERGENCY)
Facility: MEDICAL CENTER | Age: 78
End: 2023-10-09
Attending: EMERGENCY MEDICINE
Payer: MEDICARE

## 2023-10-09 ENCOUNTER — APPOINTMENT (OUTPATIENT)
Dept: RADIOLOGY | Facility: MEDICAL CENTER | Age: 78
End: 2023-10-09
Attending: EMERGENCY MEDICINE
Payer: MEDICARE

## 2023-10-09 VITALS
RESPIRATION RATE: 16 BRPM | DIASTOLIC BLOOD PRESSURE: 93 MMHG | TEMPERATURE: 98.6 F | BODY MASS INDEX: 28.58 KG/M2 | HEART RATE: 72 BPM | SYSTOLIC BLOOD PRESSURE: 157 MMHG | OXYGEN SATURATION: 94 % | WEIGHT: 182.1 LBS | HEIGHT: 67 IN

## 2023-10-09 DIAGNOSIS — N20.1 URETEROLITHIASIS: ICD-10-CM

## 2023-10-09 LAB
ALBUMIN SERPL BCP-MCNC: 4.4 G/DL (ref 3.2–4.9)
ALBUMIN/GLOB SERPL: 2.3 G/DL
ALP SERPL-CCNC: 79 U/L (ref 30–99)
ALT SERPL-CCNC: <5 U/L (ref 2–50)
ANION GAP SERPL CALC-SCNC: 10 MMOL/L (ref 7–16)
APPEARANCE UR: CLEAR
AST SERPL-CCNC: 20 U/L (ref 12–45)
BACTERIA #/AREA URNS HPF: NEGATIVE /HPF
BASOPHILS # BLD AUTO: 0.4 % (ref 0–1.8)
BASOPHILS # BLD: 0.03 K/UL (ref 0–0.12)
BILIRUB SERPL-MCNC: 0.7 MG/DL (ref 0.1–1.5)
BILIRUB UR QL STRIP.AUTO: NEGATIVE
BUN SERPL-MCNC: 29 MG/DL (ref 8–22)
CALCIUM ALBUM COR SERPL-MCNC: 8.5 MG/DL (ref 8.5–10.5)
CALCIUM SERPL-MCNC: 8.8 MG/DL (ref 8.5–10.5)
CHLORIDE SERPL-SCNC: 105 MMOL/L (ref 96–112)
CO2 SERPL-SCNC: 26 MMOL/L (ref 20–33)
COLOR UR: YELLOW
CREAT SERPL-MCNC: 1.96 MG/DL (ref 0.5–1.4)
EOSINOPHIL # BLD AUTO: 0.05 K/UL (ref 0–0.51)
EOSINOPHIL NFR BLD: 0.6 % (ref 0–6.9)
EPI CELLS #/AREA URNS HPF: NEGATIVE /HPF
ERYTHROCYTE [DISTWIDTH] IN BLOOD BY AUTOMATED COUNT: 45.1 FL (ref 35.9–50)
GFR SERPLBLD CREATININE-BSD FMLA CKD-EPI: 35 ML/MIN/1.73 M 2
GLOBULIN SER CALC-MCNC: 1.9 G/DL (ref 1.9–3.5)
GLUCOSE SERPL-MCNC: 86 MG/DL (ref 65–99)
GLUCOSE UR STRIP.AUTO-MCNC: NEGATIVE MG/DL
HCT VFR BLD AUTO: 42.8 % (ref 42–52)
HGB BLD-MCNC: 14.5 G/DL (ref 14–18)
HYALINE CASTS #/AREA URNS LPF: ABNORMAL /LPF
IMM GRANULOCYTES # BLD AUTO: 0.02 K/UL (ref 0–0.11)
IMM GRANULOCYTES NFR BLD AUTO: 0.3 % (ref 0–0.9)
KETONES UR STRIP.AUTO-MCNC: ABNORMAL MG/DL
LEUKOCYTE ESTERASE UR QL STRIP.AUTO: NEGATIVE
LIPASE SERPL-CCNC: 17 U/L (ref 11–82)
LYMPHOCYTES # BLD AUTO: 1.55 K/UL (ref 1–4.8)
LYMPHOCYTES NFR BLD: 19.8 % (ref 22–41)
MCH RBC QN AUTO: 32.7 PG (ref 27–33)
MCHC RBC AUTO-ENTMCNC: 33.9 G/DL (ref 32.3–36.5)
MCV RBC AUTO: 96.6 FL (ref 81.4–97.8)
MICRO URNS: ABNORMAL
MONOCYTES # BLD AUTO: 0.73 K/UL (ref 0–0.85)
MONOCYTES NFR BLD AUTO: 9.3 % (ref 0–13.4)
NEUTROPHILS # BLD AUTO: 5.43 K/UL (ref 1.82–7.42)
NEUTROPHILS NFR BLD: 69.6 % (ref 44–72)
NITRITE UR QL STRIP.AUTO: NEGATIVE
NRBC # BLD AUTO: 0 K/UL
NRBC BLD-RTO: 0 /100 WBC (ref 0–0.2)
PH UR STRIP.AUTO: 6 [PH] (ref 5–8)
PLATELET # BLD AUTO: 212 K/UL (ref 164–446)
PMV BLD AUTO: 9.9 FL (ref 9–12.9)
POTASSIUM SERPL-SCNC: 4.3 MMOL/L (ref 3.6–5.5)
PROT SERPL-MCNC: 6.3 G/DL (ref 6–8.2)
PROT UR QL STRIP: NEGATIVE MG/DL
RBC # BLD AUTO: 4.43 M/UL (ref 4.7–6.1)
RBC # URNS HPF: ABNORMAL /HPF
RBC UR QL AUTO: ABNORMAL
SODIUM SERPL-SCNC: 141 MMOL/L (ref 135–145)
SP GR UR STRIP.AUTO: 1.04
UROBILINOGEN UR STRIP.AUTO-MCNC: 1 MG/DL
WBC # BLD AUTO: 7.8 K/UL (ref 4.8–10.8)
WBC #/AREA URNS HPF: ABNORMAL /HPF

## 2023-10-09 PROCEDURE — 81001 URINALYSIS AUTO W/SCOPE: CPT

## 2023-10-09 PROCEDURE — 85025 COMPLETE CBC W/AUTO DIFF WBC: CPT

## 2023-10-09 PROCEDURE — 83690 ASSAY OF LIPASE: CPT

## 2023-10-09 PROCEDURE — 36415 COLL VENOUS BLD VENIPUNCTURE: CPT

## 2023-10-09 PROCEDURE — 80053 COMPREHEN METABOLIC PANEL: CPT

## 2023-10-09 PROCEDURE — 99284 EMERGENCY DEPT VISIT MOD MDM: CPT

## 2023-10-09 RX ORDER — TAMSULOSIN HYDROCHLORIDE 0.4 MG/1
0.4 CAPSULE ORAL
COMMUNITY

## 2023-10-09 RX ORDER — HYDROCODONE BITARTRATE AND ACETAMINOPHEN 5; 325 MG/1; MG/1
1 TABLET ORAL EVERY 4 HOURS PRN
COMMUNITY

## 2023-10-09 NOTE — ED NOTES
Pt ambulatory.  Vital signs stable.   Pt states understanding of discharge instructions and paperwork.   Pt states they will follow up with PCP and urology    Pt states they have a safe way home.

## 2023-10-09 NOTE — ED PROVIDER NOTES
"ED Provider Note    CHIEF COMPLAINT  Chief Complaint   Patient presents with    Flank Pain     Right side, started yesterday, h/o kidney stones.  Denies blood in urine.         EXTERNAL RECORDS REVIEWED  Outpatient Notes outpatient primary care notes    HPI/ROS  LIMITATION TO HISTORY   Select: : None      Ronak Sampson is a 78 y.o. male who presents for evaluation of right flank pain.  The patient began developing right flank pain yesterday.  The patient went to [facility] and [facility] and was diagnosed with a 5 mm stone in the right ureter.  The patient relates past history of multiple kidney stones.  The patient was placed on medications including Flomax as well as Lortab.  The patient presents here for right flank pain.  He states that he is \"scared to take the pain medication\".  The patient resting comfortably now not having any significant pain.  He denies: Fever, chills, URI symptoms, cardiorespiratory symptoms, gastrointestinal symptoms.  No other complaints.    PAST MEDICAL HISTORY   has a past medical history of BPH (benign prostatic hyperplasia), Kidney stones, and Parkinson disease.    SURGICAL HISTORY   has a past surgical history that includes gastroscopy-endo (N/A, 2/25/2016); gastroscopy with clipping (2/26/2016); and colonoscopy - endo (2/28/2016).    FAMILY HISTORY  History reviewed. No pertinent family history.    SOCIAL HISTORY  Social History     Tobacco Use    Smoking status: Never    Smokeless tobacco: Never   Vaping Use    Vaping Use: Never used   Substance and Sexual Activity    Alcohol use: Not Currently    Drug use: Never    Sexual activity: Not on file       CURRENT MEDICATIONS  Home Medications       Reviewed by Holli Reinoso R.N. (Registered Nurse) on 10/09/23 at 1434  Med List Status: Partial     Medication Last Dose Status   acetaminophen (TYLENOL) 500 MG Tab  Active   carbidopa-levodopa (SINEMET)  MG Tab 10/9/2023 Active   HYDROcodone-acetaminophen (NORCO) 5-325 " "MG Tab per tablet  Active   omeprazole (PRILOSEC) 20 MG delayed-release capsule 10/9/2023 Active   omeprazole (PRILOSEC) 40 MG delayed-release capsule  Active   tamsulosin (FLOMAX) 0.4 MG capsule 10/9/2023 Active   tramadol (ULTRAM) 50 MG Tab  Active                    ALLERGIES  No Known Allergies    PHYSICAL EXAM  VITAL SIGNS: BP (!) 157/93   Pulse 72   Temp 37 °C (98.6 °F) (Temporal)   Resp 16   Ht 1.702 m (5' 7\")   Wt 82.6 kg (182 lb 1.6 oz)   SpO2 94%   BMI 28.52 kg/m²    Constitutional: Pleasant 78-year-old male, fairly well developed, Well nourished, No acute distress, Non-toxic appearance.   HENT: Normocephalic, Atraumatic, Nares:Clear, Oropharynx: moist, well hydrated, posterior pharynx:clear   Eyes: PERRL, EOMI, Conjunctiva normal, No discharge.   Neck: Normal range of motion, No tenderness, Supple, No stridor.   Lymphatic: No lymphadenopathy noted.   Cardiovascular: Regular rate and rhythm without mumurs, gallups, rubs   Thorax & Lungs: Normal Equal breath sounds, No respiratory distress, No wheezing, no stridor, no rales. No chest tenderness.   Abdomen: Soft, nontender, nondistended, no organomegaly, positive bowel sounds normal in quality. No guarding or rebound.  Skin: Good skin turgor, pink, warm, dry. No rashes, petechiae, purpura. Normal capillary refill.   Back: No tenderness, No CVA tenderness.   Extremities: Intact distal pulses, No edema, No tenderness, No cyanosis,  Vascular: Pulses are 2+, symmetric in the upper and lower extremities.  Musculoskeletal: Diffuse arthritic changes with diffuse muscular atrophy no tenderness to palpation or major deformities noted.   Neurologic: Alert & oriented x 3,  No gross focal deficits noted.  Numbers consistent with Parkinson's;  Psychiatric: Affect normal, Judgment normal, Mood normal.       DIAGNOSTIC STUDIES / PROCEDURES    LABS  CBC shows white count 7.8, 69% neutrophils, 19% lymphocytes, 9% monocytes, hemoglobin 14.5, adequate 42.8; CMP shows a " BUN 29, creatinine 1.96, otherwise within normal; lipase 17; urinalysis positive for blood, WBC 0-2, RBCs , epithelial cells negative, bacteria negative;      COURSE & MEDICAL DECISION MAKING        INITIAL ASSESSMENT, COURSE AND PLAN  Care Narrative: At this time, the patient presents for evaluation of right flank pain.  The patient has previous diagnosis of 5 mm right ureterolithiasis made yesterday.  I discussed his medications with him.  I reassured him that it is okay for him to take narcotic analgesics as long as he is aware of the potential for addiction and abuse.  Clearly he is aware of this and is concerned and the reason he did not want to take it to begin with.  I discussed this with him and his wife.  I explained to them it is okay to take it but just needs to be monitored and needs to be aware.  After discussing with him he is more comfortable with this treatment plan.  I will hold off performing another CT scan.  I have given him follow-up with urology on-call to follow-up within the next couple days.  He was instructed to get rechecked immediately if any fever change or intractable pain.  The patient is wife indicate comfortable with this explanation and disposition.      ADDITIONAL PROBLEM LIST    DISPOSITION AND DISCUSSIONS  I have discussed management of the patient with the following physicians and EZRA's:  none      Discussion of management with other QHP or appropriate source(s): None     Escalation of care considered, and ultimately not performed:diagnostic imaging and acute inpatient care management, however at this time, the patient is most appropriate for outpatient management      Decision tools and prescription drugs considered including, but not limited to: Pain Medications patient was prescribed Lortab and Flomax yesterday and has plenty of these .    PLAN:  1.  Appropriate discharge instructions given  2.  Keep well-hydrated  3.  Medications as directed  4.  Follow-up with  urology  5.  Recheck if any change or worsening symptoms as discussed    FINAL DIAGNOSIS  1. Ureterolithiasis             Electronically signed by: Guy G Gansert, M.D., 10/9/2023 3:40 PM

## 2023-10-09 NOTE — ED TRIAGE NOTES
Pt ambulated to triage with   Chief Complaint   Patient presents with    Flank Pain     Right side, started yesterday, h/o kidney stones.  Denies blood in urine.       Pt seen at [facility] and dx with kidney stone, pain continues.  Urine sample collected and sent to lab.   Protocol ordered.  Pt Informed regarding triage process and verbalized understanding to inform triage tech or RN for any changes in condition. Placed in lobby.

## 2023-11-02 ENCOUNTER — HOSPITAL ENCOUNTER (OUTPATIENT)
Dept: RADIOLOGY | Facility: MEDICAL CENTER | Age: 78
End: 2023-11-02
Attending: UROLOGY
Payer: MEDICARE

## 2023-11-02 DIAGNOSIS — N20.0 CALCULUS OF KIDNEY: ICD-10-CM

## 2023-11-02 PROCEDURE — 74176 CT ABD & PELVIS W/O CONTRAST: CPT

## 2024-02-26 ENCOUNTER — HOSPITAL ENCOUNTER (OUTPATIENT)
Facility: MEDICAL CENTER | Age: 79
End: 2024-02-26
Attending: PHYSICIAN ASSISTANT
Payer: MEDICARE

## 2024-02-26 PROCEDURE — 87086 URINE CULTURE/COLONY COUNT: CPT

## 2024-02-29 LAB
BACTERIA UR CULT: NORMAL
SIGNIFICANT IND 70042: NORMAL
SITE SITE: NORMAL
SOURCE SOURCE: NORMAL

## 2024-05-03 ENCOUNTER — APPOINTMENT (OUTPATIENT)
Dept: NEUROLOGY | Facility: MEDICAL CENTER | Age: 79
End: 2024-05-03
Attending: PSYCHIATRY & NEUROLOGY
Payer: MEDICARE

## 2024-05-03 VITALS
BODY MASS INDEX: 28.41 KG/M2 | WEIGHT: 181 LBS | OXYGEN SATURATION: 96 % | HEART RATE: 68 BPM | HEIGHT: 67 IN | TEMPERATURE: 98.5 F | DIASTOLIC BLOOD PRESSURE: 70 MMHG | SYSTOLIC BLOOD PRESSURE: 160 MMHG | RESPIRATION RATE: 16 BRPM

## 2024-05-03 DIAGNOSIS — G20.A1 PARKINSON'S DISEASE WITHOUT DYSKINESIA OR FLUCTUATING MANIFESTATIONS (HCC): ICD-10-CM

## 2024-05-03 DIAGNOSIS — G20.A1 PARKINSON'S DISEASE (HCC): ICD-10-CM

## 2024-05-03 PROCEDURE — 99214 OFFICE O/P EST MOD 30 MIN: CPT | Performed by: PSYCHIATRY & NEUROLOGY

## 2024-05-03 PROCEDURE — 3077F SYST BP >= 140 MM HG: CPT | Performed by: PSYCHIATRY & NEUROLOGY

## 2024-05-03 PROCEDURE — 3079F DIAST BP 80-89 MM HG: CPT | Performed by: PSYCHIATRY & NEUROLOGY

## 2024-05-03 ASSESSMENT — PATIENT HEALTH QUESTIONNAIRE - PHQ9: CLINICAL INTERPRETATION OF PHQ2 SCORE: 0

## 2024-05-03 ASSESSMENT — FIBROSIS 4 INDEX: FIB4 SCORE: 3.42

## 2024-05-03 NOTE — PROGRESS NOTES
Chief Complaint   Patient presents with    Follow-Up     Parkinson's disease       History of present illness:  Ronak Sampson 78 y.o. male with Parkinson's disease for the last 3-4 years presenting with R arm tremor.       PD Meds:  Carb/levo 25/100 1 tab 3x/day at 8am/2pm/8pm. No wearing off. This has been effective at treating his tremor.      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    Sleep: He sleeps 6-7 hours per night and naps 1 hour daily.   Speech: No problems   Swallowing: No problems   Constipation: No problems.   Urination: On tamsulosin.    Dizziness: No   Hallucinations: No   Cognition: No   Balance: He has fallen down.   Pain: He has pain in the low mid right back. This is constantly present and aching. He has trouble sleeping @ night due to this.   Depression: None   Anxiety: None      6/18/21: He has started carbidopa/levodopa and is not as tremulous. He has not noticed a change in his movement speed. He has been on sinemet now for 3 months. He does not have side effects to C/L. He switched to terazosin however his blood pressure markedly decreased with standing on this. He is doing physical therapy currently with Mari Smiley.       9/22/21: His tremors are well treated on his current dose of carb/levo. His exercise consists mainly of walking and working in the yard.       3/22/22: He has not noticed a significant change in his motor function or tremors. He has slowed down significantly over the last few years. His right arm feels weaker.        9/20/22: He reports that his Parkinson's is the same. Tremor is mild, and is worse when he gets nervous. His right arm is weaker than it used to be. He is lifting weights.      3/21/23: No significant changes in his motor function. His right side is affected and his left side is normal.  His main concern is that he has trouble with the range of motion/mobility of his R arm.      Past medical history:   Past Medical  History:   Diagnosis Date    BPH (benign prostatic hyperplasia)     Kidney stones     Parkinson disease (HCC)        Past surgical history:   Past Surgical History:   Procedure Laterality Date    COLONOSCOPY - ENDO  2/28/2016    Procedure: COLONOSCOPY - ENDO;  Surgeon: Lee Powers M.D.;  Location: ENDOSCOPY [facility];  Service:     GASTROSCOPY WITH CLIPPING  2/26/2016    Procedure: GASTROSCOPY WITH CLIPPING;  Surgeon: Lee Powers M.D.;  Location: ENDOSCOPY [facility];  Service:     GASTROSCOPY-ENDO N/A 2/25/2016    Procedure: GASTROSCOPY-ENDO;  Surgeon: Piyush Cole M.D.;  Location: ENDOSCOPY [facility];  Service:        Family history:   No family history on file.    Social history:   Social History     Socioeconomic History    Marital status:      Spouse name: Not on file    Number of children: Not on file    Years of education: Not on file    Highest education level: Not on file   Occupational History    Not on file   Tobacco Use    Smoking status: Never    Smokeless tobacco: Never   Vaping Use    Vaping Use: Never used   Substance and Sexual Activity    Alcohol use: Not Currently    Drug use: Never    Sexual activity: Not on file   Other Topics Concern    Not on file   Social History Narrative    Not on file     Social Determinants of Health     Financial Resource Strain: Not on file   Food Insecurity: Not on file   Transportation Needs: Not on file   Physical Activity: Not on file   Stress: Not on file   Social Connections: Not on file   Intimate Partner Violence: Not on file   Housing Stability: Not on file       Current medications:   Current Outpatient Medications   Medication    carbidopa-levodopa (SINEMET)  MG Tab    tamsulosin (FLOMAX) 0.4 MG capsule    omeprazole (PRILOSEC) 20 MG delayed-release capsule    tramadol (ULTRAM) 50 MG Tab    acetaminophen (TYLENOL) 500 MG Tab     No current facility-administered medications for this visit.       Medication  "Allergy:  No Known Allergies    Physical examination:   Vitals:    05/03/24 1425 05/03/24 1427   BP: (!) 180/92 (!) 180/84   BP Location: Left arm Left arm   Patient Position: Sitting Standing   BP Cuff Size: Adult Adult   Pulse: (!) 58 68   Resp: 16    Temp: 36.9 °C (98.5 °F)    TempSrc: Temporal    SpO2: 98% 96%   Weight: 82.1 kg (181 lb)    Height: 1.702 m (5' 7\")      Neurological Exam  Mental Status  Awake and alert. Speech is normal. Language is fluent with no aphasia.    Motor   Increased muscle tone. Bilateral upper extremity rigidity . The following abnormal movements were seen: Mild frequent right arm and leg rest tremor.    Bilaterally decreased amplitude/speed of repetitive upper/lower extremity movements .    Gait  Casual gait:  He has mild bradykinesia   No freezing of gait   .       ASSESSMENT AND PLAN:  Problem List Items Addressed This Visit          Neurology Medicine Problems    Parkinson's disease (HCC)    Relevant Medications    carbidopa-levodopa (SINEMET)  MG Tab    Other Relevant Orders    Patient identified as fall risk.  Appropriate orders and counseling given.       1. Parkinson's disease without dyskinesia or fluctuating manifestations (HCC)  - Patient identified as fall risk.  Appropriate orders and counseling given.    2. Parkinson's disease (HCC)  - carbidopa-levodopa (SINEMET)  MG Tab; Take 2 Tablets by mouth 2 times a day AND 1.5 Tablets every evening.  Dispense: 495 Tablet; Refill: 2    Due to worsened bradykinesia/rigidity, I have provided him instructions to increase his C/L up to 200/200/150 from 100mg TID. It has been 1 year since the last visit and there has been worsening of motor symptoms.     FOLLOW-UP:   Return in about 3 months (around 8/3/2024).    Dr. Inderjit Jimenez D.O.  [facility]    "

## 2024-05-03 NOTE — PATIENT INSTRUCTIONS
Increase carbidopa/levodopa as directed below    1.5 tabs twice daily and 1 tab at bedtime for 2 weeks   Followed by 2 tabs twice daily and 1.5 tab at bedtime     Time of administration:     8am/2pm/8pm     Resume physical therapy

## 2024-07-09 ENCOUNTER — APPOINTMENT (OUTPATIENT)
Dept: RADIOLOGY | Facility: MEDICAL CENTER | Age: 79
End: 2024-07-09
Attending: EMERGENCY MEDICINE
Payer: MEDICARE

## 2024-07-09 ENCOUNTER — HOSPITAL ENCOUNTER (EMERGENCY)
Facility: MEDICAL CENTER | Age: 79
End: 2024-07-09
Attending: EMERGENCY MEDICINE
Payer: MEDICARE

## 2024-07-09 VITALS
WEIGHT: 176.81 LBS | HEART RATE: 55 BPM | SYSTOLIC BLOOD PRESSURE: 174 MMHG | RESPIRATION RATE: 18 BRPM | BODY MASS INDEX: 27.69 KG/M2 | TEMPERATURE: 97.1 F | DIASTOLIC BLOOD PRESSURE: 95 MMHG | OXYGEN SATURATION: 94 %

## 2024-07-09 DIAGNOSIS — G20.A1 PARKINSON'S DISEASE, UNSPECIFIED WHETHER DYSKINESIA PRESENT, UNSPECIFIED WHETHER MANIFESTATIONS FLUCTUATE (HCC): ICD-10-CM

## 2024-07-09 DIAGNOSIS — S20.212A CONTUSION OF LEFT CHEST WALL, INITIAL ENCOUNTER: ICD-10-CM
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
## 2024-07-09 DIAGNOSIS — G89.29 OTHER CHRONIC PAIN: ICD-10-CM

## 2024-07-09 PROCEDURE — 99284 EMERGENCY DEPT VISIT MOD MDM: CPT

## 2024-07-09 PROCEDURE — 71101 X-RAY EXAM UNILAT RIBS/CHEST: CPT | Mod: LT

## 2024-07-09 ASSESSMENT — FIBROSIS 4 INDEX: FIB4 SCORE: 3.42

## 2024-07-09 ASSESSMENT — PAIN DESCRIPTION - PAIN TYPE: TYPE: ACUTE PAIN

## 2024-07-26 ENCOUNTER — HOSPITAL ENCOUNTER (OUTPATIENT)
Facility: MEDICAL CENTER | Age: 79
End: 2024-07-26
Payer: MEDICARE

## 2024-07-26 LAB
APPEARANCE UR: CLEAR
BACTERIA #/AREA URNS HPF: NEGATIVE /HPF
BILIRUB UR QL STRIP.AUTO: NEGATIVE
COLOR UR: YELLOW
EPI CELLS #/AREA URNS HPF: NEGATIVE /HPF
GLUCOSE UR STRIP.AUTO-MCNC: NEGATIVE MG/DL
HYALINE CASTS #/AREA URNS LPF: ABNORMAL /LPF
KETONES UR STRIP.AUTO-MCNC: ABNORMAL MG/DL
LEUKOCYTE ESTERASE UR QL STRIP.AUTO: ABNORMAL
MICRO URNS: ABNORMAL
NITRITE UR QL STRIP.AUTO: NEGATIVE
PH UR STRIP.AUTO: 5.5 [PH] (ref 5–8)
PROT UR QL STRIP: NEGATIVE MG/DL
RBC # URNS HPF: ABNORMAL /HPF
RBC UR QL AUTO: NEGATIVE
SP GR UR STRIP.AUTO: 1.02
UROBILINOGEN UR STRIP.AUTO-MCNC: 1 MG/DL
WBC #/AREA URNS HPF: ABNORMAL /HPF

## 2024-07-26 PROCEDURE — 81001 URINALYSIS AUTO W/SCOPE: CPT

## 2024-08-22 ENCOUNTER — OFFICE VISIT (OUTPATIENT)
Dept: NEUROLOGY | Facility: MEDICAL CENTER | Age: 79
End: 2024-08-22
Attending: PSYCHIATRY & NEUROLOGY
Payer: MEDICARE

## 2024-08-22 VITALS
WEIGHT: 179.23 LBS | SYSTOLIC BLOOD PRESSURE: 126 MMHG | OXYGEN SATURATION: 95 % | BODY MASS INDEX: 28.13 KG/M2 | HEIGHT: 67 IN | TEMPERATURE: 98.2 F | DIASTOLIC BLOOD PRESSURE: 58 MMHG | HEART RATE: 59 BPM

## 2024-08-22 DIAGNOSIS — G20.A1 PARKINSON'S DISEASE WITHOUT DYSKINESIA OR FLUCTUATING MANIFESTATIONS (HCC): ICD-10-CM

## 2024-08-22 PROCEDURE — 3074F SYST BP LT 130 MM HG: CPT | Performed by: PSYCHIATRY & NEUROLOGY

## 2024-08-22 PROCEDURE — 99211 OFF/OP EST MAY X REQ PHY/QHP: CPT | Performed by: PSYCHIATRY & NEUROLOGY

## 2024-08-22 PROCEDURE — 99214 OFFICE O/P EST MOD 30 MIN: CPT | Performed by: PSYCHIATRY & NEUROLOGY

## 2024-08-22 PROCEDURE — 3078F DIAST BP <80 MM HG: CPT | Performed by: PSYCHIATRY & NEUROLOGY

## 2024-08-22 ASSESSMENT — FIBROSIS 4 INDEX: FIB4 SCORE: 3.47

## 2024-08-22 ASSESSMENT — PATIENT HEALTH QUESTIONNAIRE - PHQ9: CLINICAL INTERPRETATION OF PHQ2 SCORE: 0

## 2024-08-22 NOTE — PROGRESS NOTES
Chief Complaint   Patient presents with    Follow-Up     Parkinson's disease without dyskinesia or fluctuating manifestations       History of present illness:  Ronak Sampson 79 y.o. male with  Parkinson's disease for the last 3-4 years presenting with R arm tremor.       PD Meds:  Carb/levo 25/100 2/2/1.5 at 8am/2pm/8pm.      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    Sleep: He sleeps 6-7 hours per night and naps 1 hour daily.   Speech: No problems   Swallowing: No problems   Constipation: No problems.   Urination: On tamsulosin.    Dizziness: He gets low blood pressure after meals.   Hallucinations: No   Cognition: No   Balance: He has fallen down.   Pain: He has pain in the low mid right back. This is constantly present and aching. He has trouble sleeping @ night due to this.   Depression: None   Anxiety: None      6/18/21: He has started carbidopa/levodopa and is not as tremulous. He has not noticed a change in his movement speed. He has been on sinemet now for 3 months. He does not have side effects to C/L. He switched to terazosin however his blood pressure markedly decreased with standing on this. He is doing physical therapy currently with Mari Smiley.       9/22/21: His tremors are well treated on his current dose of carb/levo. His exercise consists mainly of walking and working in the yard.       3/22/22: He has not noticed a significant change in his motor function or tremors. He has slowed down significantly over the last few years. His right arm feels weaker.        9/20/22: He reports that his Parkinson's is the same. Tremor is mild, and is worse when he gets nervous. His right arm is weaker than it used to be. He is lifting weights.      3/21/23: No significant changes in his motor function. His right side is affected and his left side is normal.  His main concern is that he has trouble with the range of motion/mobility of his R arm.       Past medical history:    Past Medical History:   Diagnosis Date    BPH (benign prostatic hyperplasia)     Kidney stones     Parkinson disease (HCC)        Past surgical history:   Past Surgical History:   Procedure Laterality Date    COLONOSCOPY - ENDO  2/28/2016    Procedure: COLONOSCOPY - ENDO;  Surgeon: Lee Powers M.D.;  Location: ENDOSCOPY [facility];  Service:     GASTROSCOPY WITH CLIPPING  2/26/2016    Procedure: GASTROSCOPY WITH CLIPPING;  Surgeon: Lee Powers M.D.;  Location: ENDOSCOPY [facility];  Service:     GASTROSCOPY-ENDO N/A 2/25/2016    Procedure: GASTROSCOPY-ENDO;  Surgeon: Piyush Cole M.D.;  Location: ENDOSCOPY [facility];  Service:        Family history:   No family history on file.    Social history:   Social History     Socioeconomic History    Marital status:      Spouse name: Not on file    Number of children: Not on file    Years of education: Not on file    Highest education level: Not on file   Occupational History    Not on file   Tobacco Use    Smoking status: Never    Smokeless tobacco: Never   Vaping Use    Vaping status: Never Used   Substance and Sexual Activity    Alcohol use: Not Currently    Drug use: Never    Sexual activity: Not on file   Other Topics Concern    Not on file   Social History Narrative    Not on file     Social Determinants of Health     Financial Resource Strain: Not on file   Food Insecurity: Not on file   Transportation Needs: Not on file   Physical Activity: Not on file   Stress: Not on file   Social Connections: Not on file   Intimate Partner Violence: Not on file   Housing Stability: Not on file       Current medications:   Current Outpatient Medications   Medication    carbidopa-levodopa (SINEMET)  MG Tab    tamsulosin (FLOMAX) 0.4 MG capsule    omeprazole (PRILOSEC) 20 MG delayed-release capsule    tramadol (ULTRAM) 50 MG Tab    acetaminophen (TYLENOL) 500 MG Tab     No current facility-administered medications for this visit.  "      Medication Allergy:  No Known Allergies    Physical examination:   Vitals:    08/22/24 0806 08/22/24 0844 08/22/24 0847   BP: 130/78 128/60 126/58   BP Location: Left arm Right arm Right arm   Patient Position: Sitting Sitting Standing   BP Cuff Size: Adult     Pulse: (!) 59     Temp: 36.8 °C (98.2 °F)     TempSrc: Temporal     SpO2: 95%     Weight: 81.3 kg (179 lb 3.7 oz)     Height: 1.702 m (5' 7\")       Neurological Exam  Mental Status  Awake and alert. Speech is normal. Language is fluent with no aphasia.    Motor   Increased muscle tone. R>L upper extremity rigidity. The following abnormal movements were seen: Mild RUE rest tremor.      Gait    Slight shuffling gait   No freezing   Minimal bradykinesia .       ASSESSMENT AND PLAN:  Problem List Items Addressed This Visit          Neurology Medicine Problems    Parkinson's disease (HCC)    Relevant Orders    Referral to Physical Therapy       1. Parkinson's disease without dyskinesia or fluctuating manifestations (HCC)  - Referral to Physical Therapy    79-year-old male with Parkinson's disease, with postprandial hypotension.  He endorses that he will feel extremely dizzy after eating meals, this results in him not wanting to eat.  I checked his orthostatic vital signs in the office today, which are normal.  His tremors are improved after increasing the dose of carbidopa levodopa to 2/2/1.5.  No changes were made to the L-dopa dosage today.  Provided counseling regarding eating smaller, more frequent meals as well as supplementing his oral intake with protein shakes.  Avoid protein intake 1 hour before or after a dose of carbidopa levodopa.  I signed a DMV disability placard form for him today.     FOLLOW-UP:   Return in about 4 months (around 12/22/2024).    Total time spent for the day for this patient unrelated to procedure time is: 30 minutes. I spent 23 minutes in face to face time and I spent 1 minutes pre-charting and 6 minutes in post-visit " documentation.      Dr. Inderjit Jimenez D.O.  [facility]  Movement Disorders Specialist

## 2024-08-30 ENCOUNTER — HOSPITAL ENCOUNTER (OUTPATIENT)
Facility: MEDICAL CENTER | Age: 79
End: 2024-08-30
Payer: MEDICARE

## 2024-08-30 LAB
APPEARANCE UR: CLEAR
BACTERIA #/AREA URNS HPF: NEGATIVE /HPF
BILIRUB UR QL STRIP.AUTO: NEGATIVE
COLOR UR: YELLOW
EPI CELLS #/AREA URNS HPF: NEGATIVE /HPF
GLUCOSE UR STRIP.AUTO-MCNC: NEGATIVE MG/DL
HYALINE CASTS #/AREA URNS LPF: ABNORMAL /LPF
KETONES UR STRIP.AUTO-MCNC: NEGATIVE MG/DL
LEUKOCYTE ESTERASE UR QL STRIP.AUTO: NEGATIVE
MICRO URNS: ABNORMAL
NITRITE UR QL STRIP.AUTO: NEGATIVE
PH UR STRIP.AUTO: 6 [PH] (ref 5–8)
PROT UR QL STRIP: NEGATIVE MG/DL
RBC # URNS HPF: ABNORMAL /HPF
RBC UR QL AUTO: ABNORMAL
SP GR UR STRIP.AUTO: 1.02
UROBILINOGEN UR STRIP.AUTO-MCNC: 1 MG/DL
WBC #/AREA URNS HPF: ABNORMAL /HPF

## 2024-08-30 PROCEDURE — 81001 URINALYSIS AUTO W/SCOPE: CPT

## 2024-10-08 ENCOUNTER — HOSPITAL ENCOUNTER (EMERGENCY)
Facility: MEDICAL CENTER | Age: 79
End: 2024-10-08
Payer: MEDICARE

## 2024-10-08 VITALS
OXYGEN SATURATION: 94 % | HEART RATE: 74 BPM | RESPIRATION RATE: 16 BRPM | DIASTOLIC BLOOD PRESSURE: 83 MMHG | WEIGHT: 173.28 LBS | TEMPERATURE: 98.1 F | BODY MASS INDEX: 27.2 KG/M2 | HEIGHT: 67 IN | SYSTOLIC BLOOD PRESSURE: 133 MMHG

## 2024-10-08 PROCEDURE — 302449 STATCHG TRIAGE ONLY (STATISTIC)

## 2024-10-08 ASSESSMENT — FIBROSIS 4 INDEX: FIB4 SCORE: 3.47

## 2025-01-09 ENCOUNTER — OFFICE VISIT (OUTPATIENT)
Dept: NEUROLOGY | Facility: MEDICAL CENTER | Age: 80
End: 2025-01-09
Attending: PSYCHIATRY & NEUROLOGY
Payer: MEDICARE

## 2025-01-09 VITALS
DIASTOLIC BLOOD PRESSURE: 78 MMHG | OXYGEN SATURATION: 95 % | HEART RATE: 68 BPM | WEIGHT: 180.56 LBS | TEMPERATURE: 97.9 F | HEIGHT: 67 IN | RESPIRATION RATE: 12 BRPM | BODY MASS INDEX: 28.34 KG/M2 | SYSTOLIC BLOOD PRESSURE: 128 MMHG

## 2025-01-09 DIAGNOSIS — G20.A1 PARKINSON'S DISEASE WITHOUT DYSKINESIA OR FLUCTUATING MANIFESTATIONS (HCC): ICD-10-CM

## 2025-01-09 PROCEDURE — 99212 OFFICE O/P EST SF 10 MIN: CPT | Performed by: PSYCHIATRY & NEUROLOGY

## 2025-01-09 PROCEDURE — 3078F DIAST BP <80 MM HG: CPT | Performed by: PSYCHIATRY & NEUROLOGY

## 2025-01-09 PROCEDURE — 3074F SYST BP LT 130 MM HG: CPT | Performed by: PSYCHIATRY & NEUROLOGY

## 2025-01-09 PROCEDURE — 99213 OFFICE O/P EST LOW 20 MIN: CPT | Performed by: PSYCHIATRY & NEUROLOGY

## 2025-01-09 RX ORDER — HYDROCHLOROTHIAZIDE 25 MG/1
25 TABLET ORAL DAILY
COMMUNITY
Start: 2024-12-02

## 2025-01-09 RX ORDER — FINASTERIDE 5 MG/1
5 TABLET, FILM COATED ORAL DAILY
COMMUNITY

## 2025-01-09 RX ORDER — BUPRENORPHINE 7.5 UG/H
PATCH TRANSDERMAL
COMMUNITY
Start: 2024-12-15

## 2025-01-09 ASSESSMENT — FIBROSIS 4 INDEX: FIB4 SCORE: 3.47

## 2025-01-09 ASSESSMENT — PATIENT HEALTH QUESTIONNAIRE - PHQ9: CLINICAL INTERPRETATION OF PHQ2 SCORE: 0

## 2025-01-09 NOTE — PROGRESS NOTES
Chief Complaint   Patient presents with    Follow-Up     Parkinson's       History of present illness:  Ronak Sampson 79 y.o. male with  Parkinson's disease for the last 3-4 years presenting with R arm tremor.       PD Meds:  Carb/levo 25/100 2/2/1.5 at 8am/2pm/8pm.      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    Sleep: He sleeps 6-7 hours per night and naps 1 hour daily.   Speech: No problems   Swallowing: No problems   Constipation: No problems.   Urination: On tamsulosin.    Dizziness: He gets low blood pressure after meals.   Hallucinations: No   Cognition: No   Balance: He has fallen down.   Pain: He has pain in the low mid right back. This is constantly present and aching. He has trouble sleeping @ night due to this.   Depression: None   Anxiety: None      6/18/21: He has started carbidopa/levodopa and is not as tremulous. He has not noticed a change in his movement speed. He has been on sinemet now for 3 months. He does not have side effects to C/L. He switched to terazosin however his blood pressure markedly decreased with standing on this. He is doing physical therapy currently with Mari Smiley.       9/22/21: His tremors are well treated on his current dose of carb/levo. His exercise consists mainly of walking and working in the yard.       3/22/22: He has not noticed a significant change in his motor function or tremors. He has slowed down significantly over the last few years. His right arm feels weaker.        9/20/22: He reports that his Parkinson's is the same. Tremor is mild, and is worse when he gets nervous. His right arm is weaker than it used to be. He is lifting weights.      3/21/23: No significant changes in his motor function. His right side is affected and his left side is normal.  His main concern is that he has trouble with the range of motion/mobility of his R arm.       Past medical history:   Past Medical History:   Diagnosis Date    BPH (benign  prostatic hyperplasia)     Kidney stones     Parkinson disease (HCC)        Past surgical history:   Past Surgical History:   Procedure Laterality Date    COLONOSCOPY - ENDO  2/28/2016    Procedure: COLONOSCOPY - ENDO;  Surgeon: Lee Powers M.D.;  Location: ENDOSCOPY [facility];  Service:     GASTROSCOPY WITH CLIPPING  2/26/2016    Procedure: GASTROSCOPY WITH CLIPPING;  Surgeon: Lee Powers M.D.;  Location: ENDOSCOPY [facility];  Service:     GASTROSCOPY-ENDO N/A 2/25/2016    Procedure: GASTROSCOPY-ENDO;  Surgeon: Piyush Cole M.D.;  Location: ENDOSCOPY [facility];  Service:        Family history:   History reviewed. No pertinent family history.    Social history:   Social History     Socioeconomic History    Marital status:      Spouse name: Not on file    Number of children: Not on file    Years of education: Not on file    Highest education level: Not on file   Occupational History    Not on file   Tobacco Use    Smoking status: Never    Smokeless tobacco: Never   Vaping Use    Vaping status: Never Used   Substance and Sexual Activity    Alcohol use: Not Currently    Drug use: Never    Sexual activity: Not on file   Other Topics Concern    Not on file   Social History Narrative    Not on file     Social Drivers of Health     Financial Resource Strain: Not on file   Food Insecurity: Not on file   Transportation Needs: Not on file   Physical Activity: Not on file   Stress: Not on file   Social Connections: Not on file   Intimate Partner Violence: Not on file   Housing Stability: Not on file       Current medications:   Current Outpatient Medications   Medication    finasteride (PROSCAR) 5 MG Tab    hydroCHLOROthiazide 25 MG Tab    Buprenorphine 7.5 MCG/HR PATCH WEEKLY    carbidopa-levodopa (SINEMET)  MG Tab    omeprazole (PRILOSEC) 20 MG delayed-release capsule    tramadol (ULTRAM) 50 MG Tab    tamsulosin (FLOMAX) 0.4 MG capsule    acetaminophen (TYLENOL) 500 MG Tab  "    No current facility-administered medications for this visit.       Medication Allergy:  No Known Allergies    Physical examination:   Vitals:    01/09/25 1026   BP: 128/78   BP Location: Left arm   Patient Position: Sitting   BP Cuff Size: Adult   Pulse: 68   Resp: 12   Temp: 36.6 °C (97.9 °F)   TempSrc: Temporal   SpO2: 95%   Weight: 81.9 kg (180 lb 8.9 oz)   Height: 1.702 m (5' 7\")       Neurological Exam  Mental Status  Awake and alert. Speech is normal. Language is fluent with no aphasia.    Motor   Increased muscle tone. R>L upper extremity rigidity. The following abnormal movements were seen: Mild RUE rest tremor.      Gait    Slight shuffling gait   No freezing   Minimal bradykinesia .       ASSESSMENT AND PLAN:  Problem List Items Addressed This Visit          Neurology Medicine Problems    Parkinson's disease (HCC)         1. Parkinson's disease without dyskinesia or fluctuating manifestations (HCC)    Other orders  - finasteride (PROSCAR) 5 MG Tab; Take 5 mg by mouth every day.  - hydroCHLOROthiazide 25 MG Tab; Take 25 mg by mouth every day.  - Buprenorphine 7.5 MCG/HR PATCH WEEKLY; APPLY 1 PATCH TOPICALLY ONCE A WEEK FOR 28 DAYS      79-year-old male with Parkinson's disease, presents for DMV paperwork. Today, I signed paperwork recommending a driving test be administered by the DMV. He has mild bradykinesia and slowness of gait but does not have an exam that would prevent him from driving.     FOLLOW-UP:   Return in about 4 months (around 5/9/2025) for follow-up with Dr. Perkins .    Total time spent for the day for this patient unrelated to procedure time is: 20 minutes. I spent 10 minutes in face to face time and I spent 5 minutes pre-charting and 5 minutes in post-visit documentation.      Dr. Inderjit Jimenez D.O.  [facility]  Movement Disorders Specialist    "

## 2025-02-04 DIAGNOSIS — G20.A1 PARKINSON'S DISEASE (HCC): ICD-10-CM

## 2025-02-04 RX ORDER — CARBIDOPA AND LEVODOPA 25; 100 MG/1; MG/1
TABLET ORAL
Qty: 495 TABLET | Refills: 2 | Status: SHIPPED | OUTPATIENT
Start: 2025-02-04

## 2025-02-04 NOTE — TELEPHONE ENCOUNTER
Received request via: Pharmacy    Medication Name/Dosage   carbidopa-levodopa (SINEMET)  MG Tab      When was medication last prescribed 05/03/2024    How many refills were previously provided 2    How many Refills does he patient have left from last prescription 0    Was the patient seen in the last year in this department? Yes   Date of last office visit 01/09/2025     Per last Neurology Office Visit, when was the date of next follow up visit set for?                          4 months  Date of office visit follow up request 5/9/2025     Does the patient have an upcoming appointment? Yes   If yes, when 5/13/2025             If no, schedule appointment scheduled    Does the patient have snf Plus and need 100 day supply (blood pressure, diabetes and cholesterol meds only)? Patient does not have SCP

## 2025-05-12 ENCOUNTER — TELEPHONE (OUTPATIENT)
Dept: NEUROLOGY | Facility: MEDICAL CENTER | Age: 80
End: 2025-05-12
Payer: MEDICARE

## 2025-05-12 NOTE — TELEPHONE ENCOUNTER
NEUROLOGY PATIENT PRE-VISIT PLANNING     Patient was NOT contacted to complete PVP.  Note: Patient will not be contacted if there is no indication to call.     Patient Appointment is scheduled as: New Patient     Is visit type and length scheduled correctly? Yes    [employer] Patient is checked in Patient Demographics? Yes    3.   Is referral attached to visit? Yes    4. Were records received from referring provider? Yes    4. Patient was not contacted to have someone accompany them to visit.     5. Is this appointment scheduled as a Hospital Follow-Up?  No    6. Does the patient require any pre procedure or post procedure follow up? No    7. If any orders were placed at last visit or intended to be done for this visit do we have Results/Consult Notes? No  Labs - Labs were not ordered at last office visit.  Imaging - Imaging was not ordered at last office visit.  Referrals - No referrals were ordered at last office visit.  Note: If patient appointment is for lab or imaging review and patient did not complete the studies, check with provider if OK to reschedule patient until completed.    8. If patient appointment is for Botox - is order pended for provider? N/A    9. Was Plan Assessment from last Neurology Office Visit Reviewed?  Yes

## 2025-05-13 ENCOUNTER — OFFICE VISIT (OUTPATIENT)
Dept: NEUROLOGY | Facility: MEDICAL CENTER | Age: 80
End: 2025-05-13
Attending: INTERNAL MEDICINE
Payer: MEDICARE

## 2025-05-13 VITALS
RESPIRATION RATE: 16 BRPM | TEMPERATURE: 98.2 F | OXYGEN SATURATION: 98 % | WEIGHT: 165.79 LBS | BODY MASS INDEX: 26.02 KG/M2 | DIASTOLIC BLOOD PRESSURE: 88 MMHG | HEART RATE: 50 BPM | SYSTOLIC BLOOD PRESSURE: 150 MMHG | HEIGHT: 67 IN

## 2025-05-13 DIAGNOSIS — G20.A1 PARKINSON'S DISEASE WITHOUT DYSKINESIA OR FLUCTUATING MANIFESTATIONS (HCC): ICD-10-CM

## 2025-05-13 PROCEDURE — G2211 COMPLEX E/M VISIT ADD ON: HCPCS | Performed by: INTERNAL MEDICINE

## 2025-05-13 PROCEDURE — 3079F DIAST BP 80-89 MM HG: CPT | Performed by: INTERNAL MEDICINE

## 2025-05-13 PROCEDURE — 99215 OFFICE O/P EST HI 40 MIN: CPT | Performed by: INTERNAL MEDICINE

## 2025-05-13 PROCEDURE — 99213 OFFICE O/P EST LOW 20 MIN: CPT | Performed by: INTERNAL MEDICINE

## 2025-05-13 PROCEDURE — 3077F SYST BP >= 140 MM HG: CPT | Performed by: INTERNAL MEDICINE

## 2025-05-13 RX ORDER — CARBIDOPA AND LEVODOPA 25; 100 MG/1; MG/1
3 TABLET ORAL 3 TIMES DAILY
Qty: 810 TABLET | Refills: 3 | Status: SHIPPED | OUTPATIENT
Start: 2025-05-13 | End: 2026-05-13

## 2025-05-13 ASSESSMENT — FIBROSIS 4 INDEX: FIB4 SCORE: 3.47

## 2025-05-13 ASSESSMENT — PATIENT HEALTH QUESTIONNAIRE - PHQ9
CLINICAL INTERPRETATION OF PHQ2 SCORE: 2
SUM OF ALL RESPONSES TO PHQ QUESTIONS 1-9: 8
5. POOR APPETITE OR OVEREATING: 0 - NOT AT ALL

## 2025-05-13 NOTE — PATIENT INSTRUCTIONS
Constipation: try adding prunes or prune juice. If not effective, can add stool softener like miralax. Miralax (polyethylene glycol): 1-2 teaspoon DAILY. Increase/decrease dose on a WEEKLY interval to maintain soft bowel movements    [employer] Sock Aid Kit  Look for adaptive clothing online    Carbidopa-levodopa 25-100mg:   Week 1-2: take 2.5 tablets, 3 times a day  If needed, can increase further: Week 3-4: take 3 tablets, 3 times a day

## 2025-05-13 NOTE — PROGRESS NOTES
[facility]   [address], [address]. [place] [address]  Phone: [phone], Fax: [phone]    Primo Richardson DO  Neurology, Movement Disorders    ASSESSMENT / PLAN   Ronak Sampson is a 79 y.o. RHD male presenting for Parkinson's disease    Parkinson's disease  Sx onset approx 2021 with right sided symptoms. Exam notable for right > left rigidity and bradykinesia. Appears underdosed and will increase medication as tolerated.     - Carbidopa-levodopa 25-100mg:   Week 1-2: take 2.5 tablets, 3 times a day.   If needed, can increase further: Week 3-4: take 3 tablets, 3 times a day  - [employer] Sock Aid Kit. Look for adaptive clothing online    Constipation   - try adding prunes or prune juice  - If not effective, can add stool softener like miralax. Miralax (polyethylene glycol): 1-2 teaspoon DAILY. Increase/decrease dose on a WEEKLY interval to maintain soft bowel movements    Insomnia  - CTM    Mood disorder  - monitor with higher dose of carbidopa-levodopa      Orders Placed This Encounter    carbidopa-levodopa (SINEMET)  MG Tab     Return in about 4 months (around 9/13/2025).    BILLING DOCUMENTATION:   Excluding time spent on procedures during visit, I spent 40 minutes reviewing the medical record, interviewing and examining the patient, discussing diagnosis and treatment, and coordinating care.    No procedure          HISTORY OF PRESENT ILLNESS   Ronak Sampson is a 79 y.o. RHD male presenting for Parkinson's disease    Initial HPI 05/13/25  Diagnosis of Parkinson's disease in 2021 with symptoms of right arm tremor and reduced dexterity.      Interval history  Dr. Jimenez  6/18/21: He has started carbidopa/levodopa and is not as tremulous. He has not noticed a change in his movement speed. He has been on sinemet now for 3 months. He does not have side effects to C/L. He switched to terazosin however his blood pressure markedly decreased with standing on this. He is doing  physical therapy currently with Mari Smiley.     9/22/21: His tremors are well treated on his current dose of carb/levo. His exercise consists mainly of walking and working in the yard.     3/22/22: He has not noticed a significant change in his motor function or tremors. He has slowed down significantly over the last few years. His right arm feels weaker.      9/20/22: He reports that his Parkinson's is the same. Tremor is mild, and is worse when he gets nervous. His right arm is weaker than it used to be. He is lifting weights.    3/21/23: No significant changes in his motor function. His right side is affected and his left side is normal.  His main concern is that he has trouble with the range of motion/mobility of his R arm. No med changes  05/03/24: increase carbidopa-levodopa 200/200/150 from 100mg TID   08/22/24: no med changes  01/09/25: no med changes    05/13/25: first visit with me: increase carbidopa-levodopa 2.5 or 3 tab TID        Current Regimen  Carbidopa-levodopa , 2 at 8AM, 2 at 2PM, 1.5 at 8PM  Latency: can't tell  Duration: no motor fluctuations  Efficacy: right arm is more stiff  Dyskinesia/Dystonia: none  Sfx: none      ROS  Gait:  Mild worsening of balance, more stiff and slower  Fall: last week, tripped on wood     Orthostasis:   Previously lightheaded after meals, not as bad recently. Moved timing of BP meds to 5AM    GI:   +constipation: eating more fruit. BM qOD. Sometimes hard    :   No issues    Speech/Swallow:  No dysphagia  +hypophonia. Not interested in SLP    Cognition:   Stable    Mood:   05/13/25 PHQ-9 = 8, C-SSRS = neg  Feels down due to physical symptoms.   +anxiety: worsening recently    Hallucinations:   No issues    Sleep/RBD:   +insomnia: back pain, taking buprenorphine and tramadol PRN for pain  +daytime fatigue  No known RBD      Past Medical History:   Diagnosis Date    BPH (benign prostatic hyperplasia)     Kidney stones     Parkinson disease (HCC)      Past  Surgical History:   Procedure Laterality Date    COLONOSCOPY - ENDO  2/28/2016    Procedure: COLONOSCOPY - ENDO;  Surgeon: Lee Powers M.D.;  Location: ENDOSCOPY [facility];  Service:     GASTROSCOPY WITH CLIPPING  2/26/2016    Procedure: GASTROSCOPY WITH CLIPPING;  Surgeon: Lee Powers M.D.;  Location: ENDOSCOPY [facility];  Service:     GASTROSCOPY-ENDO N/A 2/25/2016    Procedure: GASTROSCOPY-ENDO;  Surgeon: Piyush Cole M.D.;  Location: ENDOSCOPY [facility];  Service:      No family history on file.  Social History     Socioeconomic History    Marital status:      Spouse name: Not on file    Number of children: Not on file    Years of education: Not on file    Highest education level: Not on file   Occupational History    Not on file   Tobacco Use    Smoking status: Never    Smokeless tobacco: Never   Vaping Use    Vaping status: Never Used   Substance and Sexual Activity    Alcohol use: Not Currently    Drug use: Never    Sexual activity: Not on file   Other Topics Concern    Not on file   Social History Narrative    Not on file     Social Drivers of Health     Financial Resource Strain: Not on file   Food Insecurity: Not on file   Transportation Needs: Not on file   Physical Activity: Not on file   Stress: Not on file   Social Connections: Not on file   Intimate Partner Violence: Not on file   Housing Stability: Not on file     Current Outpatient Medications   Medication    carbidopa-levodopa (SINEMET)  MG Tab    finasteride (PROSCAR) 5 MG Tab    hydroCHLOROthiazide 25 MG Tab    Buprenorphine 7.5 MCG/HR PATCH WEEKLY    omeprazole (PRILOSEC) 20 MG delayed-release capsule    tramadol (ULTRAM) 50 MG Tab    tamsulosin (FLOMAX) 0.4 MG capsule    acetaminophen (TYLENOL) 500 MG Tab     No current facility-administered medications for this visit.     No Known Allergies          DATA / RESULTS     Glycohemoglobin   Date Value Ref Range Status   02/24/2016 5.4 0.0 - 5.6 %  "Final     Comment:     Increased risk for diabetes:  5.7 -6.4%  Diabetes:  >6.4%  Glycemic control for adults with diabetes:  <7.0%  The above interpretations are per ADA guidelines.  Diagnosis  of diabetes mellitus on the basis of elevated Hemoglobin A1c  should be confirmed by repeating the Hb A1c test.                  OBJECTIVE      Vitals:    05/13/25 1001   BP: (!) 150/88   BP Location: Left arm   Patient Position: Sitting   BP Cuff Size: Adult   Pulse: (!) 50   Resp: 16   Temp: 36.8 °C (98.2 °F)   TempSrc: Temporal   SpO2: 98%   Weight: 75.2 kg (165 lb 12.6 oz)   Height: 1.702 m (5' 7\")     Physical Exam  Last dose of C/L taken 8AM (can't tell if ON or OFF)     General: NAD, appears stated age.      Mental status: Speech clear and fluent without tremor. +hypophonia. Fund of knowledge is good.      Cranial Nerves:  CN2: PERRL. Visual fields are full to finger confrontation.   CN3/4/6: EOMI. There is no nystagmus  CN5: V1-V3 intact to light touch    CN7: Symmetric face. +hypomimia  CN8: Hearing grossly intact.   CN9/10/12: Soft palate and uvula rise symmetrically. Tongue midline.   CN11: Shoulder shrug intact bilaterally.     Strength  Right Left   Shoulder Abduction  5 5   Elbow Flexion 5 5   Elbow Extension  5 5   Wrist Extension  5 5   Finger Extension  5  4  strength 5   Hip Flexion  4 5   Knee Extension 4 5   Ankle Dorsiflexion  4 5     Deep Tendon Reflexes: R1L2+ biceps, brachioradialis, 2+ patella and 1+ ankles    Abnormal movements:    UPDRS Right Left   Finger tapping 1 1   Hand Movement 3 1   Toe Tapping 0 1   Leg Agility 2 2   Rigidity 2 2   Rest Tremor 2 0   Postural Tremor 0 1   Kinetic Tremor 1 1   +generalized BK    No dystonia, dyskinesias, tics, stereotypies, athetosis, akathisia, or chorea noted.      Sensory: normal to sharp, temperature.    Quantitative tuning fork Right Left   Hands 8 8   Feet 6 5       Cerebellar: No dysmetria with FTN    Gait:   Posture - normal   Base - narrow "   Stride length - normal   Arm swing - slight decrease in right arm swing with 1-3cm tremor   Speed - normal  Shuffling/freezing - none  U-Turn - normal   Pull test - normal, 1-2 steps          PROCEDURE     N/A